# Patient Record
Sex: MALE | Race: OTHER | NOT HISPANIC OR LATINO | URBAN - METROPOLITAN AREA
[De-identification: names, ages, dates, MRNs, and addresses within clinical notes are randomized per-mention and may not be internally consistent; named-entity substitution may affect disease eponyms.]

---

## 2022-10-06 ENCOUNTER — INPATIENT (INPATIENT)
Facility: HOSPITAL | Age: 52
LOS: 1 days | Discharge: HOME | DRG: 247 | End: 2022-10-08
Attending: INTERNAL MEDICINE | Admitting: INTERNAL MEDICINE
Payer: MEDICAID

## 2022-10-06 VITALS
OXYGEN SATURATION: 99 % | RESPIRATION RATE: 18 BRPM | TEMPERATURE: 98 F | SYSTOLIC BLOOD PRESSURE: 147 MMHG | HEIGHT: 69 IN | DIASTOLIC BLOOD PRESSURE: 80 MMHG | WEIGHT: 223.99 LBS | HEART RATE: 81 BPM

## 2022-10-06 DIAGNOSIS — I21.3 ST ELEVATION (STEMI) MYOCARDIAL INFARCTION OF UNSPECIFIED SITE: ICD-10-CM

## 2022-10-06 LAB
ALBUMIN SERPL ELPH-MCNC: 4.4 G/DL — SIGNIFICANT CHANGE UP (ref 3.3–5)
ALP SERPL-CCNC: 59 U/L — SIGNIFICANT CHANGE UP (ref 40–120)
ALT FLD-CCNC: 32 U/L — SIGNIFICANT CHANGE UP (ref 10–45)
ANION GAP SERPL CALC-SCNC: 11 MMOL/L — SIGNIFICANT CHANGE UP (ref 5–17)
APTT BLD: 174.1 SEC — CRITICAL HIGH (ref 27.5–35.5)
AST SERPL-CCNC: 23 U/L — SIGNIFICANT CHANGE UP (ref 10–40)
BASOPHILS # BLD AUTO: 0.06 K/UL — SIGNIFICANT CHANGE UP (ref 0–0.2)
BASOPHILS NFR BLD AUTO: 0.7 % — SIGNIFICANT CHANGE UP (ref 0–2)
BILIRUB SERPL-MCNC: 0.8 MG/DL — SIGNIFICANT CHANGE UP (ref 0.2–1.2)
BLD GP AB SCN SERPL QL: NEGATIVE — SIGNIFICANT CHANGE UP
BUN SERPL-MCNC: 12 MG/DL — SIGNIFICANT CHANGE UP (ref 7–23)
CALCIUM SERPL-MCNC: 9.2 MG/DL — SIGNIFICANT CHANGE UP (ref 8.4–10.5)
CHLORIDE SERPL-SCNC: 104 MMOL/L — SIGNIFICANT CHANGE UP (ref 96–108)
CK MB BLD-MCNC: 1.6 % — SIGNIFICANT CHANGE UP (ref 0–3.5)
CK MB CFR SERPL CALC: 3.5 NG/ML — SIGNIFICANT CHANGE UP (ref 0–6.7)
CK SERPL-CCNC: 224 U/L — HIGH (ref 30–200)
CO2 SERPL-SCNC: 25 MMOL/L — SIGNIFICANT CHANGE UP (ref 22–31)
CREAT SERPL-MCNC: 0.95 MG/DL — SIGNIFICANT CHANGE UP (ref 0.5–1.3)
EGFR: 96 ML/MIN/1.73M2 — SIGNIFICANT CHANGE UP
EOSINOPHIL # BLD AUTO: 0.31 K/UL — SIGNIFICANT CHANGE UP (ref 0–0.5)
EOSINOPHIL NFR BLD AUTO: 3.6 % — SIGNIFICANT CHANGE UP (ref 0–6)
GLUCOSE SERPL-MCNC: 113 MG/DL — HIGH (ref 70–99)
HCT VFR BLD CALC: 43.1 % — SIGNIFICANT CHANGE UP (ref 39–50)
HGB BLD-MCNC: 15.1 G/DL — SIGNIFICANT CHANGE UP (ref 13–17)
IMM GRANULOCYTES NFR BLD AUTO: 0.5 % — SIGNIFICANT CHANGE UP (ref 0–0.9)
INR BLD: 1.11 RATIO — SIGNIFICANT CHANGE UP (ref 0.88–1.16)
LYMPHOCYTES # BLD AUTO: 2.11 K/UL — SIGNIFICANT CHANGE UP (ref 1–3.3)
LYMPHOCYTES # BLD AUTO: 24.4 % — SIGNIFICANT CHANGE UP (ref 13–44)
MAGNESIUM SERPL-MCNC: 1.9 MG/DL — SIGNIFICANT CHANGE UP (ref 1.6–2.6)
MCHC RBC-ENTMCNC: 30.1 PG — SIGNIFICANT CHANGE UP (ref 27–34)
MCHC RBC-ENTMCNC: 35 GM/DL — SIGNIFICANT CHANGE UP (ref 32–36)
MCV RBC AUTO: 86 FL — SIGNIFICANT CHANGE UP (ref 80–100)
MONOCYTES # BLD AUTO: 0.65 K/UL — SIGNIFICANT CHANGE UP (ref 0–0.9)
MONOCYTES NFR BLD AUTO: 7.5 % — SIGNIFICANT CHANGE UP (ref 2–14)
NEUTROPHILS # BLD AUTO: 5.46 K/UL — SIGNIFICANT CHANGE UP (ref 1.8–7.4)
NEUTROPHILS NFR BLD AUTO: 63.3 % — SIGNIFICANT CHANGE UP (ref 43–77)
NRBC # BLD: 0 /100 WBCS — SIGNIFICANT CHANGE UP (ref 0–0)
NT-PROBNP SERPL-SCNC: 40 PG/ML — SIGNIFICANT CHANGE UP (ref 0–300)
PHOSPHATE SERPL-MCNC: 2.9 MG/DL — SIGNIFICANT CHANGE UP (ref 2.5–4.5)
PLATELET # BLD AUTO: 208 K/UL — SIGNIFICANT CHANGE UP (ref 150–400)
POTASSIUM SERPL-MCNC: 3.7 MMOL/L — SIGNIFICANT CHANGE UP (ref 3.5–5.3)
POTASSIUM SERPL-SCNC: 3.7 MMOL/L — SIGNIFICANT CHANGE UP (ref 3.5–5.3)
PROT SERPL-MCNC: 7.2 G/DL — SIGNIFICANT CHANGE UP (ref 6–8.3)
PROTHROM AB SERPL-ACNC: 12.8 SEC — SIGNIFICANT CHANGE UP (ref 10.5–13.4)
RBC # BLD: 5.01 M/UL — SIGNIFICANT CHANGE UP (ref 4.2–5.8)
RBC # FLD: 11.8 % — SIGNIFICANT CHANGE UP (ref 10.3–14.5)
RH IG SCN BLD-IMP: POSITIVE — SIGNIFICANT CHANGE UP
SODIUM SERPL-SCNC: 140 MMOL/L — SIGNIFICANT CHANGE UP (ref 135–145)
TROPONIN T, HIGH SENSITIVITY RESULT: 67 NG/L — HIGH (ref 0–51)
WBC # BLD: 8.63 K/UL — SIGNIFICANT CHANGE UP (ref 3.8–10.5)
WBC # FLD AUTO: 8.63 K/UL — SIGNIFICANT CHANGE UP (ref 3.8–10.5)

## 2022-10-06 PROCEDURE — 93458 L HRT ARTERY/VENTRICLE ANGIO: CPT | Mod: 26,59

## 2022-10-06 PROCEDURE — 99152 MOD SED SAME PHYS/QHP 5/>YRS: CPT

## 2022-10-06 PROCEDURE — 92941 PRQ TRLML REVSC TOT OCCL AMI: CPT | Mod: LD

## 2022-10-06 PROCEDURE — 93010 ELECTROCARDIOGRAM REPORT: CPT

## 2022-10-06 PROCEDURE — 99292 CRITICAL CARE ADDL 30 MIN: CPT

## 2022-10-06 RX ORDER — CHLORHEXIDINE GLUCONATE 213 G/1000ML
1 SOLUTION TOPICAL
Refills: 0 | Status: DISCONTINUED | OUTPATIENT
Start: 2022-10-06 | End: 2022-10-08

## 2022-10-06 RX ORDER — ASPIRIN/CALCIUM CARB/MAGNESIUM 324 MG
81 TABLET ORAL DAILY
Refills: 0 | Status: DISCONTINUED | OUTPATIENT
Start: 2022-10-07 | End: 2022-10-08

## 2022-10-06 RX ORDER — MAGNESIUM SULFATE 500 MG/ML
1 VIAL (ML) INJECTION ONCE
Refills: 0 | Status: COMPLETED | OUTPATIENT
Start: 2022-10-06 | End: 2022-10-06

## 2022-10-06 RX ORDER — POTASSIUM CHLORIDE 20 MEQ
40 PACKET (EA) ORAL ONCE
Refills: 0 | Status: COMPLETED | OUTPATIENT
Start: 2022-10-06 | End: 2022-10-06

## 2022-10-06 RX ORDER — METOPROLOL TARTRATE 50 MG
12.5 TABLET ORAL
Refills: 0 | Status: DISCONTINUED | OUTPATIENT
Start: 2022-10-06 | End: 2022-10-07

## 2022-10-06 RX ORDER — HEPARIN SODIUM 5000 [USP'U]/ML
5000 INJECTION INTRAVENOUS; SUBCUTANEOUS EVERY 8 HOURS
Refills: 0 | Status: DISCONTINUED | OUTPATIENT
Start: 2022-10-07 | End: 2022-10-08

## 2022-10-06 RX ORDER — INFLUENZA VIRUS VACCINE 15; 15; 15; 15 UG/.5ML; UG/.5ML; UG/.5ML; UG/.5ML
0.5 SUSPENSION INTRAMUSCULAR ONCE
Refills: 0 | Status: COMPLETED | OUTPATIENT
Start: 2022-10-06 | End: 2022-10-06

## 2022-10-06 RX ORDER — ATORVASTATIN CALCIUM 80 MG/1
80 TABLET, FILM COATED ORAL AT BEDTIME
Refills: 0 | Status: DISCONTINUED | OUTPATIENT
Start: 2022-10-06 | End: 2022-10-08

## 2022-10-06 RX ORDER — CLOPIDOGREL BISULFATE 75 MG/1
600 TABLET, FILM COATED ORAL ONCE
Refills: 0 | Status: COMPLETED | OUTPATIENT
Start: 2022-10-07 | End: 2022-10-07

## 2022-10-06 RX ORDER — METOPROLOL TARTRATE 50 MG
12.5 TABLET ORAL
Refills: 0 | Status: DISCONTINUED | OUTPATIENT
Start: 2022-10-06 | End: 2022-10-06

## 2022-10-06 RX ADMIN — Medication 100 GRAM(S): at 19:59

## 2022-10-06 RX ADMIN — ATORVASTATIN CALCIUM 80 MILLIGRAM(S): 80 TABLET, FILM COATED ORAL at 19:59

## 2022-10-06 RX ADMIN — Medication 40 MILLIEQUIVALENT(S): at 19:59

## 2022-10-06 RX ADMIN — CHLORHEXIDINE GLUCONATE 1 APPLICATION(S): 213 SOLUTION TOPICAL at 19:59

## 2022-10-06 RX ADMIN — Medication 12.5 MILLIGRAM(S): at 18:43

## 2022-10-06 NOTE — H&P ADULT - HISTORY OF PRESENT ILLNESS
52M Luxembourgish speaker with no known pmhx who initially presented to [facility] for b/l chest pain x 2 days which radiated down b/l upper arms, found to have AWSTEMI, transferred to [facility] for LHC. S/p LHC with 90% pLAD s/p DESx1. He denies ever experiencing sx like this before. Denies any residual CP. No SOB. Uninsured and has not seen a doctor in years. No PCP.  52M Macedonian speaker with no known pmhx who initially presented to [facility] for b/l chest pain x 2 days which radiated down b/l upper arms, found to have AWSTEMI, transferred to [facility] for LHC. S/p LHC with 99% pLAD s/p DESx1. He denies ever experiencing sx like this before. Denies any residual CP. No SOB. Uninsured and has not seen a doctor in years. No PCP.  52M Georgian speaker with no known pmhx who initially presented to [facility] for b/l chest pain x 2 days which radiated down b/l upper arms, found to have AWSTEMI, transferred to [facility] for LHC. S/p LHC with 99% pLAD s/p DESx1, D1 60%, D2 70%. He denies ever experiencing sx like this before. Denies any residual CP. No SOB. Uninsured and has not seen a doctor in years. No PCP.

## 2022-10-06 NOTE — H&P ADULT - NSHPPHYSICALEXAM_GEN_ALL_CORE
Gen: no acute distress  HEENT: atraumatic, normocephalic, extraocular muscles intact, no conjunctival injection  CV: regular rate and rhythm, no murmurs  Resp: CTAB  GI: soft, nontender, nondistended, BS+  MSK: extremities atraumatic, no cyanosis or clubbing, +R radial band  Skin: warm, dry, no rashes or lesions  Neuro: no focal deficits, sensation grossly intact  Psych: alert and oriented x3, appropriate mood and affect

## 2022-10-06 NOTE — H&P ADULT - ASSESSMENT
ASSESSMENT  52M Mohawk speaker with no known pmhx who presented with chest pain found to have AWSTEMI, s/p LHC with 90% pLAD s/p DESx1.     PLAN  Neuro  AOx3 at baseline    CV  #AWSTEMI  C 10/6 with 90% pLAD s/p DESx1.   - ASA and Plavix/Brilinta  - TTE ordered  - Atorvastatin 80  - Lipid panel, A1c, TSH  - BB and ACE pending clinical course     Pulmonary  No active issues    GI  DASH diet    Renal  No active issues    MSK/Skin  No active issues    ID  No active issues    Endo  - obtain A1c and TSH    Heme/Onc  #DVT ppx: HSQ   ASSESSMENT  52M Indonesian speaker with no known pmhx who presented with chest pain found to have AWSTEMI, s/p LHC with 90% pLAD s/p DESx1.     PLAN  Neuro  AOx3 at baseline    CV  #AWSTEMI  C 10/6 with 90% pLAD s/p DESx1.   - ASA and Plavix/Brilinta  - TTE ordered  - Atorvastatin 80  - Lipid panel, A1c, TSH  - BB and ACE as tolerated    Pulmonary  No active issues    GI  DASH diet    Renal  No active issues    MSK/Skin  No active issues    ID  No active issues    Endo  - obtain A1c and TSH    Heme/Onc  #DVT ppx: HSQ   ASSESSMENT  52M Latvian speaker with no known pmhx who presented with chest pain found to have AWSTEMI, s/p LHC with 99% pLAD s/p DESx1.     PLAN  Neuro  AOx3 at baseline    CV  #AWSTEMI  [facility] 10/6 with 99% pLAD s/p DESx1.   - ASA and Plavix/Brilinta  - TTE ordered  - Atorvastatin 80  - Lipid panel, A1c, TSH  - BB and ACE as tolerated    Pulmonary  No active issues    GI  DASH diet    Renal  No active issues    MSK/Skin  No active issues    ID  No active issues    Endo  - obtain A1c and TSH    Heme/Onc  #DVT ppx: HSQ   ASSESSMENT  52M Czech speaker with no known pmhx who presented with chest pain found to have AWSTEMI, s/p LHC with 99% pLAD s/p DESx1, D1 60%, D2 70%.    PLAN  Neuro  AOx3 at baseline    CV  #AWSTEMI  [facility] 10/6 with 99% pLAD s/p DESx1, D1 60%, D2 70%.  - ASA and Plavix/Brilinta  - TTE ordered  - Atorvastatin 80  - Lipid panel, A1c, TSH  - BB and ACE as tolerated    Pulmonary  No active issues    GI  DASH diet    Renal  No active issues    MSK/Skin  No active issues    ID  No active issues    Endo  - obtain A1c and TSH    Heme/Onc  #DVT ppx: HSQ

## 2022-10-06 NOTE — PATIENT PROFILE ADULT - FALL HARM RISK - UNIVERSAL INTERVENTIONS
Bed in lowest position, wheels locked, appropriate side rails in place/Call bell, personal items and telephone in reach/Instruct patient to call for assistance before getting out of bed or chair/Non-slip footwear when patient is out of bed/[place] to call system/Physically safe environment - no spills, clutter or unnecessary equipment/Purposeful Proactive Rounding/Room/bathroom lighting operational, light cord in reach

## 2022-10-06 NOTE — PROGRESS NOTE ADULT - SUBJECTIVE AND OBJECTIVE BOX
AMALIA MERCHANT  MRN-[id number]  Patient is a 52y old  Male who presents with a chief complaint of STEMI (06 Oct 2022 18:16)    HPI:  52M Urdu speaker with no known pmhx who initially presented to [facility] for b/l chest pain x 2 days which radiated down b/l upper arms, found to have AWSTEMI, transferred to [facility] for LHC. S/p LHC with 99% pLAD s/p DESx1, D1 60%, D2 70%. He denies ever experiencing sx like this before. Denies any residual CP. No SOB. Uninsured and has not seen a doctor in years. No PCP.  (06 Oct 2022 18:16)      Hospital Course:    24 HOUR EVENTS:    REVIEW OF SYSTEMS:    CONSTITUTIONAL: No weakness, fevers or chills  EYES/ENT: No visual changes;  No vertigo or throat pain   NECK: No pain or stiffness  RESPIRATORY: No cough, wheezing, hemoptysis; No shortness of breath  CARDIOVASCULAR: No chest pain or palpitations  GASTROINTESTINAL: No abdominal or epigastric pain. No nausea, vomiting, or hematemesis; No diarrhea or constipation. No melena or hematochezia.  GENITOURINARY: No dysuria, frequency or hematuria  NEUROLOGICAL: No numbness or weakness  SKIN: No itching, rashes      ICU Vital Signs Last 24 Hrs  T(C): 36.6 (06 Oct 2022 18:00), Max: 36.6 (06 Oct 2022 18:00)  T(F): 97.8 (06 Oct 2022 18:00), Max: 97.8 (06 Oct 2022 18:00)  HR: 73 (06 Oct 2022 20:00) (73 - 88)  BP: 128/64 (06 Oct 2022 20:00) (127/73 - 151/88)  BP(mean): 91 (06 Oct 2022 20:00) (91 - 113)  ABP: --  ABP(mean): --  RR: 18 (06 Oct 2022 20:00) (14 - 29)  SpO2: 98% (06 Oct 2022 20:00) (96% - 99%)    O2 Parameters below as of 06 Oct 2022 18:00  Patient On (Oxygen Delivery Method): room air      I&O's Summary    06 Oct 2022 07:01  -  06 Oct 2022 20:25  --------------------------------------------------------  IN: 120 mL / OUT: 500 mL / NET: -380 mL        CAPILLARY BLOOD GLUCOSE    ============================I/O===========================   I&O's Detail    06 Oct 2022 07:01  -  06 Oct 2022 20:25  --------------------------------------------------------  IN:    Oral Fluid: 120 mL  Total IN: 120 mL    OUT:    Voided (mL): 500 mL  Total OUT: 500 mL    Total NET: -380 mL        ============================ LABS =========================                        15.1   8.63  )-----------( 208      ( 06 Oct 2022 18:42 )             43.1     10-06    140  |  104  |  12  ----------------------------<  113<H>  3.7   |  25  |  0.95    Ca    9.2      06 Oct 2022 18:42  Phos  2.9     10-06  Mg     1.9     10-06    TPro  7.2  /  Alb  4.4  /  TBili  0.8  /  DBili  x   /  AST  23  /  ALT  32  /  AlkPhos  59  10-06    Troponin T, High Sensitivity Result: 67 ng/L (10-06-22 @ 18:42)      Creatine Kinase, Serum: 224 U/L (10-06-22 @ 18:42)          LIVER FUNCTIONS - ( 06 Oct 2022 18:42 )  Alb: 4.4 g/dL / Pro: 7.2 g/dL / ALK PHOS: 59 U/L / ALT: 32 U/L / AST: 23 U/L / GGT: x           PT/INR - ( 06 Oct 2022 18:42 )   PT: 12.8 sec;   INR: 1.11 ratio         PTT - ( 06 Oct 2022 18:42 )  PTT:174.1 sec        ======================Micro/Rad/Cardio=================  Telemtry: Reviewed   EKG: Reviewed  CXR: Reviewed  Culture: Reviewed   Echo:   Cath:   ======================================================  PAST MEDICAL & SURGICAL HISTORY:  No pertinent past medical history      No significant past surgical history        ====================ASSESSMENT ==============  52M Urdu speaker with no known pmhx who presented with chest pain found to have AWSTEMI, s/p LHC with 99% pLAD s/p DESx1, D1 60%, D2 70%.    Plan:  ====================== NEUROLOGY=====================  AOx3 at baseline    ==================== RESPIRATORY======================  No active issues  ====================CARDIOVASCULAR==================  #AWSTEMI  LHC 10/6 with 99% pLAD s/p DESx1, D1 60%, D2 70%.  - ASA and Plavix/Brilinta  - TTE ordered  - Atorvastatin 80  - Lipid panel, A1c, TSH  - BB and ACE as tolerated    ===================HEMATOLOGIC/ONC ===================  #DVT ppx: HSQ  ===================== RENAL =========================  No active issues  ==================== GASTROINTESTINAL===================  DASH diet    =======================    ENDOCRINE  =====================  - obtain A1c and TSH    ========================INFECTIOUS DISEASE================  No active issues    Patient requires continuous monitoring with bedside rhythm monitoring, pulse ox monitoring, and intermittent blood gas analysis. Care plan discussed with ICU care team. Patient remained critical and at risk for life threatening decompensation.  Patient seen, examined and plan discussed with CCU team during rounds.     I have personally provided ____ minutes of critical care time excluding time spent on separate procedures, in addition to initial critical care time provided by the CICU Attending, Dr. Tanner.     By signing my name below, I, Sharon Deluna, attest that this documentation has been prepared under the direction and in the presence of Dang Harding PA.  Electronically signed: Alexus Shultz, 10-06-22 @ 20:25    I, Dang Harding, personally performed the services described in this documentation. all medical record entries made by the alexus were at my direction and in my presence. I have reviewed the chart and agree that the record reflects my personal performance and is accurate and complete  Electronically signed: Dang Harding PA.       AMALIA MERCHANT  MRN-[id number]  Patient is a 52y old  Male who presents with a chief complaint of STEMI (06 Oct 2022 18:16)    HPI:  52M Maltese speaker with no known pmhx who initially presented to [facility] for b/l chest pain x 2 days which radiated down b/l upper arms, found to have AWSTEMI, transferred to [facility] for LHC. S/p LHC with 99% pLAD s/p DESx1, D1 60%, D2 70%. He denies ever experiencing sx like this before. Denies any residual CP. No SOB. Uninsured and has not seen a doctor in years. No PCP.  (06 Oct 2022 18:16)    EVENTS:  - remains stable w/o chest pain on RA  - R radial band in place, will d/c and monitor site ~10pm  - will uptitrate GDMT as tolerated    REVIEW OF SYSTEMS:    CONSTITUTIONAL: No weakness, fevers or chills  EYES/ENT: No visual changes;  No vertigo or throat pain   NECK: No pain or stiffness  RESPIRATORY: No cough, wheezing, hemoptysis; No shortness of breath  CARDIOVASCULAR: No chest pain or palpitations  GASTROINTESTINAL: No abdominal or epigastric pain. No nausea, vomiting, or hematemesis; No diarrhea or constipation. No melena or hematochezia.  GENITOURINARY: No dysuria, frequency or hematuria  NEUROLOGICAL: No numbness or weakness  SKIN: No itching, rashes      ICU Vital Signs Last 24 Hrs  T(C): 36.6 (06 Oct 2022 18:00), Max: 36.6 (06 Oct 2022 18:00)  T(F): 97.8 (06 Oct 2022 18:00), Max: 97.8 (06 Oct 2022 18:00)  HR: 73 (06 Oct 2022 20:00) (73 - 88)  BP: 128/64 (06 Oct 2022 20:00) (127/73 - 151/88)  BP(mean): 91 (06 Oct 2022 20:00) (91 - 113)  ABP: --  ABP(mean): --  RR: 18 (06 Oct 2022 20:00) (14 - 29)  SpO2: 98% (06 Oct 2022 20:00) (96% - 99%)    O2 Parameters below as of 06 Oct 2022 18:00  Patient On (Oxygen Delivery Method): room air      I&O's Summary    06 Oct 2022 07:01  -  06 Oct 2022 20:25  --------------------------------------------------------  IN: 120 mL / OUT: 500 mL / NET: -380 mL    CAPILLARY BLOOD GLUCOSE    ============================I/O===========================   I&O's Detail    06 Oct 2022 07:01  -  06 Oct 2022 20:25  --------------------------------------------------------  IN:    Oral Fluid: 120 mL  Total IN: 120 mL    OUT:    Voided (mL): 500 mL  Total OUT: 500 mL    Total NET: -380 mL    ============================ LABS =========================                        15.1   8.63  )-----------( 208      ( 06 Oct 2022 18:42 )             43.1     10-06    140  |  104  |  12  ----------------------------<  113<H>  3.7   |  25  |  0.95    Ca    9.2      06 Oct 2022 18:42  Phos  2.9     10-06  Mg     1.9     10-06    TPro  7.2  /  Alb  4.4  /  TBili  0.8  /  DBili  x   /  AST  23  /  ALT  32  /  AlkPhos  59  10-06  Troponin T, High Sensitivity Result: 67 ng/L (10-06-22 @ 18:42)  Creatine Kinase, Serum: 224 U/L (10-06-22 @ 18:42)    LIVER FUNCTIONS - ( 06 Oct 2022 18:42 )  Alb: 4.4 g/dL / Pro: 7.2 g/dL / ALK PHOS: 59 U/L / ALT: 32 U/L / AST: 23 U/L / GGT: x           PT/INR - ( 06 Oct 2022 18:42 )   PT: 12.8 sec;   INR: 1.11 ratio         PTT - ( 06 Oct 2022 18:42 )  PTT:174.1 sec    ======================Micro/Rad/Cardio=================  Telemtry: Reviewed   EKG: Reviewed  CXR: Reviewed  Culture: Reviewed   Echo:   Cath:   ======================================================  PAST MEDICAL & SURGICAL HISTORY:  No pertinent past medical history    No significant past surgical history    ====================ASSESSMENT ==============  52M Maltese speaker with no known pmhx who presented with chest pain found to have AWSTEMI, s/p LHC with 99% pLAD s/p DESx1, D1 60%, D2 70%.    Plan:  ====================== NEUROLOGY=====================  #No active issues:  AOx3 at baseline    ==================== RESPIRATORY======================  # No active issues:  - stable on RA, O2sat 98%    ====================CARDIOVASCULAR==================  #AWSTEMI  - s/p LHC 10/6 with 99% pLAD s/p DESx1, D1 60%, D2 70%.  - ASA and Plavix/ discontinue Brilinta  - TTE ordered  - Atorvastatin 80  - Lipid panel, A1c, TSH  - BB: Lopressor 12.5mg BID  - will add ACE as tolerated    ===================HEMATOLOGIC/ONC ===================  #DVT ppx:   - continue HSQ  ===================== RENAL =========================  #No active issues:  - SCr wnl, 0.95 on arrival to CICU  - monitor UOP   ==================== GASTROINTESTINAL===================  #Diet:  - DASH diet    =======================    ENDOCRINE  =====================  #No active issues:  - f/u A1c and TSH    ========================INFECTIOUS DISEASE================  #No active issues:  - afebrile w/o leukocytosis  - continue to monitor/trend temps/WBC    Patient requires continuous monitoring with bedside rhythm monitoring, pulse ox monitoring, and intermittent blood gas analysis. Care plan discussed with ICU care team. Patient remained critical and at risk for life threatening decompensation.  Patient seen, examined and plan discussed with CCU team during rounds.     I have personally provided 35 minutes of critical care time excluding time spent on separate procedures, in addition to initial critical care time provided by the CICU Attending, Dr. Tanner.     By signing my name below, MARCO, Sharon Deluna, attest that this documentation has been prepared under the direction and in the presence of Dang Harding PA.  Electronically signed: Dave Shultz, 10-06-22 @ 20:25    MARCO, Dang Harding, personally performed the services described in this documentation. all medical record entries made by the scribe were at my direction and in my presence. I have reviewed the chart and agree that the record reflects my personal performance and is accurate and complete  Electronically signed: Dang Harding PA.

## 2022-10-06 NOTE — H&P ADULT - NSHPREVIEWOFSYSTEMS_GEN_ALL_CORE
Constitutional: no weight change, no fever, no chills, no night sweats, no fatigue  ENT/mouth: no hearing changes, no sore throat, no rhinorrhea  Eyes: no eye pain, no eye redness, no eye swelling, no vision changes  CV: no chest pain, no orthopnea, no palpitations  Resp: no shortness of breath, no cough, no wheezing  GI: no abdominal pain, no nausea, no vomiting, no diarrhea, no constipation  : no dysuria, no urinary frequency or hesitancy, no hematuria  Skin/MSK: no pain, no rashes, no lower extremity edema  Neuro: no weakness, no numbness, no loss of consciousness, no syncope, no dizziness, no headache

## 2022-10-06 NOTE — H&P ADULT - NSHPSOCIALHISTORY_GEN_ALL_CORE
Lives with friends.  Works as a helper part time  Denies tobacco use. Drinks 1-2 beers/week. Denies any other substances.

## 2022-10-06 NOTE — CHART NOTE - NSCHARTNOTEFT_GEN_A_CORE
Removal of Radial Band    Pulses in the right upper extremity are palpable. The patient was placed in the supine position. The insertion site was identified and the band deflated per protocol. The radial band was removed slowly. Direct pressure was not applicable.      Monitoring of the right wrists and both upper extremities including neuro-vascular checks and vital signs every 15 minutes x 4.    Complications: None      10/6/2022, 11:40PM

## 2022-10-07 ENCOUNTER — TRANSCRIPTION ENCOUNTER (OUTPATIENT)
Age: 52
End: 2022-10-07

## 2022-10-07 LAB
A1C WITH ESTIMATED AVERAGE GLUCOSE RESULT: 5.8 % — HIGH (ref 4–5.6)
ALBUMIN SERPL ELPH-MCNC: 4.7 G/DL — SIGNIFICANT CHANGE UP (ref 3.3–5)
ALP SERPL-CCNC: 66 U/L — SIGNIFICANT CHANGE UP (ref 40–120)
ALT FLD-CCNC: 36 U/L — SIGNIFICANT CHANGE UP (ref 10–45)
ANION GAP SERPL CALC-SCNC: 9 MMOL/L — SIGNIFICANT CHANGE UP (ref 5–17)
APTT BLD: 30.5 SEC — SIGNIFICANT CHANGE UP (ref 27.5–35.5)
AST SERPL-CCNC: 29 U/L — SIGNIFICANT CHANGE UP (ref 10–40)
BASOPHILS # BLD AUTO: 0.06 K/UL — SIGNIFICANT CHANGE UP (ref 0–0.2)
BASOPHILS NFR BLD AUTO: 0.8 % — SIGNIFICANT CHANGE UP (ref 0–2)
BILIRUB SERPL-MCNC: 0.5 MG/DL — SIGNIFICANT CHANGE UP (ref 0.2–1.2)
BUN SERPL-MCNC: 15 MG/DL — SIGNIFICANT CHANGE UP (ref 7–23)
CALCIUM SERPL-MCNC: 9.5 MG/DL — SIGNIFICANT CHANGE UP (ref 8.4–10.5)
CHLORIDE SERPL-SCNC: 104 MMOL/L — SIGNIFICANT CHANGE UP (ref 96–108)
CHOLEST SERPL-MCNC: 201 MG/DL — HIGH
CK MB BLD-MCNC: 2.9 % — SIGNIFICANT CHANGE UP (ref 0–3.5)
CK MB BLD-MCNC: 3.6 % — HIGH (ref 0–3.5)
CK MB BLD-MCNC: 4.3 % — HIGH (ref 0–3.5)
CK MB CFR SERPL CALC: 10.6 NG/ML — HIGH (ref 0–6.7)
CK MB CFR SERPL CALC: 12.4 NG/ML — HIGH (ref 0–6.7)
CK MB CFR SERPL CALC: 6.9 NG/ML — HIGH (ref 0–6.7)
CK SERPL-CCNC: 242 U/L — HIGH (ref 30–200)
CK SERPL-CCNC: 288 U/L — HIGH (ref 30–200)
CK SERPL-CCNC: 291 U/L — HIGH (ref 30–200)
CO2 SERPL-SCNC: 27 MMOL/L — SIGNIFICANT CHANGE UP (ref 22–31)
CREAT SERPL-MCNC: 0.94 MG/DL — SIGNIFICANT CHANGE UP (ref 0.5–1.3)
EGFR: 98 ML/MIN/1.73M2 — SIGNIFICANT CHANGE UP
EOSINOPHIL # BLD AUTO: 0.26 K/UL — SIGNIFICANT CHANGE UP (ref 0–0.5)
EOSINOPHIL NFR BLD AUTO: 3.5 % — SIGNIFICANT CHANGE UP (ref 0–6)
ESTIMATED AVERAGE GLUCOSE: 120 MG/DL — HIGH (ref 68–114)
GLUCOSE SERPL-MCNC: 103 MG/DL — HIGH (ref 70–99)
HCT VFR BLD CALC: 46.3 % — SIGNIFICANT CHANGE UP (ref 39–50)
HDLC SERPL-MCNC: 39 MG/DL — LOW
HGB BLD-MCNC: 15.9 G/DL — SIGNIFICANT CHANGE UP (ref 13–17)
IMM GRANULOCYTES NFR BLD AUTO: 0.4 % — SIGNIFICANT CHANGE UP (ref 0–0.9)
INR BLD: 0.99 RATIO — SIGNIFICANT CHANGE UP (ref 0.88–1.16)
LIPID PNL WITH DIRECT LDL SERPL: 146 MG/DL — HIGH
LYMPHOCYTES # BLD AUTO: 1.42 K/UL — SIGNIFICANT CHANGE UP (ref 1–3.3)
LYMPHOCYTES # BLD AUTO: 19.3 % — SIGNIFICANT CHANGE UP (ref 13–44)
MAGNESIUM SERPL-MCNC: 2.2 MG/DL — SIGNIFICANT CHANGE UP (ref 1.6–2.6)
MCHC RBC-ENTMCNC: 29.8 PG — SIGNIFICANT CHANGE UP (ref 27–34)
MCHC RBC-ENTMCNC: 34.3 GM/DL — SIGNIFICANT CHANGE UP (ref 32–36)
MCV RBC AUTO: 86.9 FL — SIGNIFICANT CHANGE UP (ref 80–100)
MONOCYTES # BLD AUTO: 0.73 K/UL — SIGNIFICANT CHANGE UP (ref 0–0.9)
MONOCYTES NFR BLD AUTO: 9.9 % — SIGNIFICANT CHANGE UP (ref 2–14)
NEUTROPHILS # BLD AUTO: 4.86 K/UL — SIGNIFICANT CHANGE UP (ref 1.8–7.4)
NEUTROPHILS NFR BLD AUTO: 66.1 % — SIGNIFICANT CHANGE UP (ref 43–77)
NON HDL CHOLESTEROL: 162 MG/DL — HIGH
NRBC # BLD: 0 /100 WBCS — SIGNIFICANT CHANGE UP (ref 0–0)
PHOSPHATE SERPL-MCNC: 3.9 MG/DL — SIGNIFICANT CHANGE UP (ref 2.5–4.5)
PLATELET # BLD AUTO: 220 K/UL — SIGNIFICANT CHANGE UP (ref 150–400)
POTASSIUM SERPL-MCNC: 4.1 MMOL/L — SIGNIFICANT CHANGE UP (ref 3.5–5.3)
POTASSIUM SERPL-SCNC: 4.1 MMOL/L — SIGNIFICANT CHANGE UP (ref 3.5–5.3)
PROT SERPL-MCNC: 7.8 G/DL — SIGNIFICANT CHANGE UP (ref 6–8.3)
PROTHROM AB SERPL-ACNC: 11.4 SEC — SIGNIFICANT CHANGE UP (ref 10.5–13.4)
RBC # BLD: 5.33 M/UL — SIGNIFICANT CHANGE UP (ref 4.2–5.8)
RBC # FLD: 11.8 % — SIGNIFICANT CHANGE UP (ref 10.3–14.5)
SODIUM SERPL-SCNC: 140 MMOL/L — SIGNIFICANT CHANGE UP (ref 135–145)
TRIGL SERPL-MCNC: 80 MG/DL — SIGNIFICANT CHANGE UP
TROPONIN T, HIGH SENSITIVITY RESULT: 149 NG/L — HIGH (ref 0–51)
TROPONIN T, HIGH SENSITIVITY RESULT: 166 NG/L — HIGH (ref 0–51)
TROPONIN T, HIGH SENSITIVITY RESULT: 207 NG/L — HIGH (ref 0–51)
TSH SERPL-MCNC: 2.62 UIU/ML — SIGNIFICANT CHANGE UP (ref 0.27–4.2)
WBC # BLD: 7.36 K/UL — SIGNIFICANT CHANGE UP (ref 3.8–10.5)
WBC # FLD AUTO: 7.36 K/UL — SIGNIFICANT CHANGE UP (ref 3.8–10.5)

## 2022-10-07 PROCEDURE — 99291 CRITICAL CARE FIRST HOUR: CPT

## 2022-10-07 PROCEDURE — 99231 SBSQ HOSP IP/OBS SF/LOW 25: CPT

## 2022-10-07 PROCEDURE — 93306 TTE W/DOPPLER COMPLETE: CPT | Mod: 26

## 2022-10-07 RX ORDER — METOPROLOL TARTRATE 50 MG
25 TABLET ORAL
Refills: 0 | Status: DISCONTINUED | OUTPATIENT
Start: 2022-10-07 | End: 2022-10-07

## 2022-10-07 RX ORDER — LISINOPRIL 2.5 MG/1
2.5 TABLET ORAL DAILY
Refills: 0 | Status: DISCONTINUED | OUTPATIENT
Start: 2022-10-07 | End: 2022-10-07

## 2022-10-07 RX ORDER — CLOPIDOGREL BISULFATE 75 MG/1
75 TABLET, FILM COATED ORAL DAILY
Refills: 0 | Status: DISCONTINUED | OUTPATIENT
Start: 2022-10-08 | End: 2022-10-08

## 2022-10-07 RX ORDER — METOPROLOL TARTRATE 50 MG
25 TABLET ORAL ONCE
Refills: 0 | Status: COMPLETED | OUTPATIENT
Start: 2022-10-07 | End: 2022-10-07

## 2022-10-07 RX ORDER — METOPROLOL TARTRATE 50 MG
50 TABLET ORAL DAILY
Refills: 0 | Status: DISCONTINUED | OUTPATIENT
Start: 2022-10-08 | End: 2022-10-08

## 2022-10-07 RX ORDER — LOSARTAN POTASSIUM 100 MG/1
50 TABLET, FILM COATED ORAL DAILY
Refills: 0 | Status: DISCONTINUED | OUTPATIENT
Start: 2022-10-08 | End: 2022-10-08

## 2022-10-07 RX ADMIN — Medication 25 MILLIGRAM(S): at 17:45

## 2022-10-07 RX ADMIN — ATORVASTATIN CALCIUM 80 MILLIGRAM(S): 80 TABLET, FILM COATED ORAL at 21:12

## 2022-10-07 RX ADMIN — Medication 25 MILLIGRAM(S): at 05:25

## 2022-10-07 RX ADMIN — HEPARIN SODIUM 5000 UNIT(S): 5000 INJECTION INTRAVENOUS; SUBCUTANEOUS at 21:12

## 2022-10-07 RX ADMIN — HEPARIN SODIUM 5000 UNIT(S): 5000 INJECTION INTRAVENOUS; SUBCUTANEOUS at 05:25

## 2022-10-07 RX ADMIN — HEPARIN SODIUM 5000 UNIT(S): 5000 INJECTION INTRAVENOUS; SUBCUTANEOUS at 13:31

## 2022-10-07 RX ADMIN — Medication 81 MILLIGRAM(S): at 13:31

## 2022-10-07 RX ADMIN — LISINOPRIL 2.5 MILLIGRAM(S): 2.5 TABLET ORAL at 06:24

## 2022-10-07 RX ADMIN — CLOPIDOGREL BISULFATE 600 MILLIGRAM(S): 75 TABLET, FILM COATED ORAL at 05:25

## 2022-10-07 RX ADMIN — CHLORHEXIDINE GLUCONATE 1 APPLICATION(S): 213 SOLUTION TOPICAL at 05:39

## 2022-10-07 NOTE — DISCHARGE NOTE PROVIDER - HOSPITAL COURSE
52M with no known prior hx who presented with chest pain, found to have AWSTEMI, s/p LHC with 99% pLAD s/p DESx1, D1 60%, D2 70%. TTE 51%: normal LVSF, no segmental WMA. He was started on ASA, Plavix, atorvastatin, losartan and metoprolol. He is medically optimized for discharge with outpatient medicine and cardiology follow up.

## 2022-10-07 NOTE — PROGRESS NOTE ADULT - ASSESSMENT
====================ASSESSMENT ==============  52M Mongolian speaker with no known pmhx who presented with chest pain found to have AWSTEMI, s/p LHC with 99% pLAD s/p DESx1, D1 60%, D2 70%.    Plan:  ====================== NEUROLOGY=====================  #No active issues:  AOx3 at baseline    ==================== RESPIRATORY======================  # No active issues:  - stable on RA, O2sat 98%    ====================CARDIOVASCULAR==================  #AWSTEMI  - s/p LHC 10/6 with 99% pLAD s/p DESx1, D1 60%, D2 70%.  - ASA and Plavix/ discontinue Brilinta  - TTE ordered  - Atorvastatin 80  - BB: Lopressor 25mg BID, lisinopril 2.5  ===================HEMATOLOGIC/ONC ===================  #DVT ppx:   - continue HSQ  ===================== RENAL =========================  #No active issues:  - SCr wnl, 0.95 on arrival to CICU  - monitor UOP   ==================== GASTROINTESTINAL===================  #Diet:  - DASH diet    =======================    ENDOCRINE  =====================  #No active issues:  - A1c 5.8 and TSH wnl  -     ========================INFECTIOUS DISEASE================  #No active issues:  - afebrile w/o leukocytosis  - continue to monitor/trend temps/WBC

## 2022-10-07 NOTE — DISCHARGE NOTE PROVIDER - NSDCFUADDAPPT_GEN_ALL_CORE_FT
Primary care:  10/20 3:30 Dr. Carrera  [facility]  Phone: [phone]  [address] [place] [address], [address]  [place], [place] [id number] [place] [address] Cardiology  10/12 3:30 pm Dr. Brewster  Phone: [phone]   [address] [place] [address], 1st [place] [address]

## 2022-10-07 NOTE — DISCHARGE NOTE PROVIDER - NSDCCPCAREPLAN_GEN_ALL_CORE_FT
PRINCIPAL DISCHARGE DIAGNOSIS  Diagnosis: STEMI (ST elevation myocardial infarction)  Assessment and Plan of Treatment: You had a heart attack and were taken for a procedure called a heart catheterization, where you were found to have blockages in the vessels that supply your heart. A stent was placed to relieve the major blockage. An echocardiogram, which is an ultrasound of your heart, showed that your heart is pumping well. You will need to take aspirin and clopidogrel (Plavix) daily until your cardiologist says otherwise. This is extremely important to prevent your stents from closing back up. You will also need to take the blood pressure medicines losartan and metoprolol and the cholesterol medication atorvastatin to protect your heart.  Please return to the hospital if you develop recurrent chest pain.

## 2022-10-07 NOTE — DISCHARGE NOTE PROVIDER - NSDCFUSCHEDAPPT_GEN_ALL_CORE_FT
[facility] Physician [facility]  CARDIOLOGY [address]  Scheduled Appointment: 10/12/2022    [facility]  INTMED  [facility]   Scheduled Appointment: 10/20/2022

## 2022-10-07 NOTE — PROGRESS NOTE ADULT - SUBJECTIVE AND OBJECTIVE BOX
AMALIA MERCHANT  MRN-[id number]  Patient is a 52y old  Male who presents with a chief complaint of STEMI (07 Oct 2022 15:40)    HPI:  52M Czech speaker with no known pmhx who initially presented to [facility] for b/l chest pain x 2 days which radiated down b/l upper arms, found to have AWSTEMI, transferred to [facility] for LHC. S/p LHC with 99% pLAD s/p DESx1, D1 60%, D2 70%. He denies ever experiencing sx like this before. Denies any residual CP. No SOB. Uninsured and has not seen a doctor in years. No PCP.  (06 Oct 2022 18:16)      Hospital Course:  10/6 Transferred to CCU for further management     24 HOUR EVENTS:    REVIEW OF SYSTEMS:  CONSTITUTIONAL: No weakness, fevers or chills  EYES/ENT: No visual changes;  No vertigo or throat pain   NECK: No pain or stiffness  RESPIRATORY: No cough, wheezing, hemoptysis; No shortness of breath  CARDIOVASCULAR: No chest pain or palpitations  GASTROINTESTINAL: No abdominal or epigastric pain. No nausea, vomiting, or hematemesis; No diarrhea or constipation. No melena or hematochezia.  GENITOURINARY: No dysuria, frequency or hematuria  NEUROLOGICAL: No numbness or weakness  SKIN: No itching, rashes      ICU Vital Signs Last 24 Hrs  T(C): 36.6 (07 Oct 2022 20:00), Max: 36.9 (07 Oct 2022 08:00)  T(F): 97.8 (07 Oct 2022 20:00), Max: 98.5 (07 Oct 2022 08:00)  HR: 60 (07 Oct 2022 22:00) (60 - 82)  BP: 100/59 (07 Oct 2022 22:00) (86/59 - 163/65)  BP(mean): 76 (07 Oct 2022 22:00) (68 - 109)  ABP: --  ABP(mean): --  RR: 14 (07 Oct 2022 22:00) (12 - 23)  SpO2: 96% (07 Oct 2022 22:00) (96% - 99%)    O2 Parameters below as of 07 Oct 2022 23:00  Patient On (Oxygen Delivery Method): room air         I&O's Summary    06 Oct 2022 07:01  -  07 Oct 2022 07:00  --------------------------------------------------------  IN: 120 mL / OUT: 1350 mL / NET: -1230 mL    07 Oct 2022 07:01  -  07 Oct 2022 22:40  --------------------------------------------------------  IN: 720 mL / OUT: 1150 mL / NET: -430 mL         PHYSICAL EXAM:  GENERAL: No acute distress, well-developed  HEAD:  Atraumatic, Normocephalic  EYES: EOMI, PERRLA, conjunctiva and sclera clear  NECK: Supple, no lymphadenopathy, no JVD  CHEST/LUNG: CTAB; No wheezes, rales, or rhonchi  HEART: Regular rate and rhythm. Normal S1/S2. No murmurs, rubs, or gallops  ABDOMEN: Soft, non-tender, non-distended; normal bowel sounds, no organomegaly  EXTREMITIES:  2+ peripheral pulses b/l, No clubbing, cyanosis, or edema  NEUROLOGY: A&O x 3, no focal deficits  SKIN: No rashes or lesions    ============================I/O===========================   I&O's Detail    06 Oct 2022 07:01  -  07 Oct 2022 07:00  --------------------------------------------------------  IN:    Oral Fluid: 120 mL  Total IN: 120 mL    OUT:    Voided (mL): 1350 mL  Total OUT: 1350 mL    Total NET: -1230 mL      07 Oct 2022 07:01  -  07 Oct 2022 22:40  --------------------------------------------------------  IN:    Oral Fluid: 720 mL  Total IN: 720 mL    OUT:    Voided (mL): 1150 mL  Total OUT: 1150 mL    Total NET: -430 mL        ============================ LABS =========================                        15.9   7.36  )-----------( 220      ( 07 Oct 2022 03:51 )             46.3     10-07    140  |  104  |  15  ----------------------------<  103<H>  4.1   |  27  |  0.94    Ca    9.5      07 Oct 2022 03:51  Phos  3.9     10-07  Mg     2.2     10-07    TPro  7.8  /  Alb  4.7  /  TBili  0.5  /  DBili  x   /  AST  29  /  ALT  36  /  AlkPhos  66  10-07    Troponin T, High Sensitivity Result: 207 ng/L (10-07-22 @ 20:32)  Troponin T, High Sensitivity Result: 166 ng/L (10-07-22 @ 10:20)    CKMB Units: 6.9 ng/mL (10-07-22 @ 20:32)  CKMB Units: 12.4 ng/mL (10-07-22 @ 10:20)    Creatine Kinase, Serum: 242 U/L (10-07-22 @ 20:32)  Creatine Kinase, Serum: 288 U/L (10-07-22 @ 10:20)    CPK Mass Assay %: 2.9 % (10-07-22 @ 20:32)  CPK Mass Assay %: 4.3 % (10-07-22 @ 10:20)        LIVER FUNCTIONS - ( 07 Oct 2022 03:51 )  Alb: 4.7 g/dL / Pro: 7.8 g/dL / ALK PHOS: 66 U/L / ALT: 36 U/L / AST: 29 U/L / GGT: x           PT/INR - ( 07 Oct 2022 03:51 )   PT: 11.4 sec;   INR: 0.99 ratio         PTT - ( 07 Oct 2022 03:51 )  PTT:30.5 sec      ======================Micro/Rad/Cardio=================  Telemetry: Reviewed   EKG: Reviewed  CXR: Reviewed  ======================================================  PAST MEDICAL & SURGICAL HISTORY:  No pertinent past medical history      No significant past surgical history        ====================ASSESSMENT ==============  52M Czech speaker with no known pmhx who presented with chest pain found to have AWSTEMI, s/p LHC with 99% pLAD s/p DESx1, D1 60%, D2 70%.    Plan:  ====================== NEUROLOGY=====================  No acute issues   - AOx3 at baseline  - continue close monitoring of neuro status     ==================== RESPIRATORY======================  No acute issues   - stable on RA   - continue pulse ox monitoring, follow ABGs       ====================CARDIOVASCULAR==================  AWSTEMI  - s/p LHC 10/6 with 99% pLAD s/p DESx1, D1 60%, D2 70%.  - TTE on 10/7: EF 51%, normal BiV function   - discontinued Brilinta  - DAPT with ASA + Plavix / Atorvastatin 80  - Dc;d Lopressor 25mg BID, lisinopril 2.5    aspirin enteric coated 81 milliGRAM(s) Oral daily  atorvastatin 80 milliGRAM(s) Oral at bedtime    ===================HEMATOLOGIC/ONC ===================  DVT ppx:   - continue HSQ  - monitor H&H/Plts     heparin   Injectable 5000 Unit(s) SubCutaneous every 8 hours    ===================== RENAL =========================  No acute issues  - SCr wnl, 0.95 on arrival to CICU  - continue to monitor I&Os, BUN/Cr, and UOP   - replete lytes PRN, keep K>4 and Mg>2     ==================== GASTROINTESTINAL===================  No acute issues   - tolerating DASH diet     =======================    ENDOCRINE  =====================  No acute issues    - A1c 5.8 and TSH wnl  -   - continue to monitor blood glucose closely for need to initiate sliding scale     ========================INFECTIOUS DISEASE================  No acute issues   - afebrile, WBC within normal limits   - continue to monitor WBC and fever curve         Patient requires continuous monitoring with bedside rhythm monitoring, pulse ox monitoring, and intermittent blood gas analysis. Care plan discussed with ICU care team. Patient remained critical and at risk for life threatening decompensation.  Patient seen, examined and plan discussed with CCU team during rounds.     I have personally provided ____ minutes of critical care time excluding time spent on separate procedures, in addition to initial critical care time provided by the CICU Attending, Dr. Tanner    By signing my name below, I, Nathalie Antonio, attest that this documentation has been prepared under the direction and in the presence of CEDRIC Rizzo   Electronically signed: Alexus Grande, 10-07-22 @ 22:40    I, Kasia Loomis, personally performed the services described in this documentation. all medical record entries made by the alexus were at my direction and in my presence. I have reviewed the chart and agree that the record reflects my personal performance and is accurate and complete  Electronically signed: CEDRIC Rizzo        AMALIA MERCHANT  MRN-[id number]  Patient is a 52y old  Male who presents with a chief complaint of STEMI (07 Oct 2022 15:40)    HPI:  52M Romansh speaker with no known pmhx who initially presented to [facility] for b/l chest pain x 2 days which radiated down b/l upper arms, found to have AWSTEMI, transferred to [facility] for LHC. S/p LHC with 99% pLAD s/p DESx1, D1 60%, D2 70%. He denies ever experiencing sx like this before. Denies any residual CP. No SOB. Uninsured and has not seen a doctor in years. No PCP.  (06 Oct 2022 18:16)      REVIEW OF SYSTEMS:  CONSTITUTIONAL: No weakness, fevers or chills  EYES/ENT: No visual changes;  No vertigo or throat pain   NECK: No pain or stiffness  RESPIRATORY: No cough, wheezing, hemoptysis; No shortness of breath  CARDIOVASCULAR: No chest pain or palpitations  GASTROINTESTINAL: No abdominal or epigastric pain. No nausea, vomiting, or hematemesis; No diarrhea or constipation. No melena or hematochezia.  GENITOURINARY: No dysuria, frequency or hematuria  NEUROLOGICAL: No numbness or weakness  SKIN: No itching, rashes      ICU Vital Signs Last 24 Hrs  T(C): 36.6 (07 Oct 2022 20:00), Max: 36.9 (07 Oct 2022 08:00)  T(F): 97.8 (07 Oct 2022 20:00), Max: 98.5 (07 Oct 2022 08:00)  HR: 60 (07 Oct 2022 22:00) (60 - 82)  BP: 100/59 (07 Oct 2022 22:00) (86/59 - 163/65)  BP(mean): 76 (07 Oct 2022 22:00) (68 - 109)  ABP: --  ABP(mean): --  RR: 14 (07 Oct 2022 22:00) (12 - 23)  SpO2: 96% (07 Oct 2022 22:00) (96% - 99%)    O2 Parameters below as of 07 Oct 2022 23:00  Patient On (Oxygen Delivery Method): room air         I&O's Summary    06 Oct 2022 07:01  -  07 Oct 2022 07:00  --------------------------------------------------------  IN: 120 mL / OUT: 1350 mL / NET: -1230 mL    07 Oct 2022 07:01  -  07 Oct 2022 22:40  --------------------------------------------------------  IN: 720 mL / OUT: 1150 mL / NET: -430 mL           ============================I/O===========================   I&O's Detail    06 Oct 2022 07:01  -  07 Oct 2022 07:00  --------------------------------------------------------  IN:    Oral Fluid: 120 mL  Total IN: 120 mL    OUT:    Voided (mL): 1350 mL  Total OUT: 1350 mL    Total NET: -1230 mL      07 Oct 2022 07:01  -  07 Oct 2022 22:40  --------------------------------------------------------  IN:    Oral Fluid: 720 mL  Total IN: 720 mL    OUT:    Voided (mL): 1150 mL  Total OUT: 1150 mL    Total NET: -430 mL        ============================ LABS =========================                        15.9   7.36  )-----------( 220      ( 07 Oct 2022 03:51 )             46.3     10-07    140  |  104  |  15  ----------------------------<  103<H>  4.1   |  27  |  0.94    Ca    9.5      07 Oct 2022 03:51  Phos  3.9     10-07  Mg     2.2     10-07    TPro  7.8  /  Alb  4.7  /  TBili  0.5  /  DBili  x   /  AST  29  /  ALT  36  /  AlkPhos  66  10-07    Troponin T, High Sensitivity Result: 207 ng/L (10-07-22 @ 20:32)  Troponin T, High Sensitivity Result: 166 ng/L (10-07-22 @ 10:20)    CKMB Units: 6.9 ng/mL (10-07-22 @ 20:32)  CKMB Units: 12.4 ng/mL (10-07-22 @ 10:20)    Creatine Kinase, Serum: 242 U/L (10-07-22 @ 20:32)  Creatine Kinase, Serum: 288 U/L (10-07-22 @ 10:20)    CPK Mass Assay %: 2.9 % (10-07-22 @ 20:32)  CPK Mass Assay %: 4.3 % (10-07-22 @ 10:20)        LIVER FUNCTIONS - ( 07 Oct 2022 03:51 )  Alb: 4.7 g/dL / Pro: 7.8 g/dL / ALK PHOS: 66 U/L / ALT: 36 U/L / AST: 29 U/L / GGT: x           PT/INR - ( 07 Oct 2022 03:51 )   PT: 11.4 sec;   INR: 0.99 ratio         PTT - ( 07 Oct 2022 03:51 )  PTT:30.5 sec      ======================Micro/Rad/Cardio=================  Telemetry: Reviewed   EKG: Reviewed  CXR: Reviewed  ======================================================  PAST MEDICAL & SURGICAL HISTORY:  No pertinent past medical history      No significant past surgical history        ====================ASSESSMENT ==============  52M Romansh speaker with no known pmhx who presented with chest pain found to have AWSTEMI, s/p LHC with 99% pLAD s/p DESx1, D1 60%, D2 70%.    Plan:  ====================== NEUROLOGY=====================  AOx3, nonfocal deficits   - continue close monitoring of neuro status     ==================== RESPIRATORY======================  Comfortable on RA   - continue pulse ox monitoring, goal SpO2 >92%       ====================CARDIOVASCULAR==================  AWSTEMI  - s/p LHC 10/6 with 99% pLAD s/p DESx1, D1 60%, D2 70%.  - TTE on 10/7: EF 51%, normal BiV function   - discontinued Brilinta and transitioned to Plavix   - c/w DAPT: ASA + Plavix   - c/w Atorvastatin 80  - c/w toprol xL for gdmt  - c/w losartan for gdmt     aspirin enteric coated 81 milliGRAM(s) Oral daily  atorvastatin 80 milliGRAM(s) Oral at bedtime    ===================HEMATOLOGIC/ONC ===================  H/H and plts wnl   - DVT ppx: continue HSQ  - monitor H&H/Plts     heparin   Injectable 5000 Unit(s) SubCutaneous every 8 hours    ===================== RENAL =========================   SCr wnl  - continue to monitor I&Os, BUN/Cr, and UOP   - replete lytes PRN, keep K>4 and Mg>2     ==================== GASTROINTESTINAL===================  tolerating DASH diet   - monitor for regular BM while inpatient     =======================    ENDOCRINE  =====================  No acute issues    - A1c 5.8 and TSH wnl  -   - continue to monitor blood glucose closely for need to initiate sliding scale. BG goal 120-180     ========================INFECTIOUS DISEASE================  afebrile, WBC within normal limits   - continue to monitor WBC and fever curve         Patient requires continuous monitoring with bedside rhythm monitoring, pulse ox monitoring, and intermittent blood gas analysis. Care plan discussed with ICU care team. Patient remained critical and at risk for life threatening decompensation.  Patient seen, examined and plan discussed with CCU team during rounds.     I have personally provided _35___ minutes of critical care time excluding time spent on separate procedures, in addition to initial critical care time provided by the CICU Attending, Dr. Tanner    By signing my name below, I, Nathalie Antonio, attest that this documentation has been prepared under the direction and in the presence of CEDRIC Rizzo   Electronically signed: Alexus Grande, 10-07-22 @ 22:40    I, Kasia Loomis, personally performed the services described in this documentation. all medical record entries made by the alexus were at my direction and in my presence. I have reviewed the chart and agree that the record reflects my personal performance and is accurate and complete  Electronically signed: CEDRIC Rizzo

## 2022-10-07 NOTE — DISCHARGE NOTE PROVIDER - NSDCMRMEDTOKEN_GEN_ALL_CORE_FT
aspirin 81 mg oral delayed release tablet: 1 tab(s) orally once a day  atorvastatin 80 mg oral tablet: 1 tab(s) orally once a day (at bedtime)  clopidogrel 75 mg oral tablet: 1 tab(s) orally once a day  losartan 25 mg oral tablet: 1 tab(s) orally once a day  metoprolol succinate 50 mg oral tablet, extended release: 1 tab(s) orally once a day

## 2022-10-07 NOTE — PROGRESS NOTE ADULT - SUBJECTIVE AND OBJECTIVE BOX
Lakeshia Rogel MD    Internal Medicine Resident (PGY-2)    PATIENT: AMALIA MERCHANT, MRN: [id number]    CHIEF COMPLAINT: Patient is a 52y old  Male who presents with a chief complaint of STEMI (06 Oct 2022 20:24)      INTERVAL HISTORY/OVERNIGHT EVENTS:  Lopressor increased to 25. Started on lisinopril.  Switched to plavix this AM.    MEDICATIONS:  MEDICATIONS  (STANDING):  aspirin enteric coated 81 milliGRAM(s) Oral daily  atorvastatin 80 milliGRAM(s) Oral at bedtime  chlorhexidine 4% Liquid 1 Application(s) Topical <User Schedule>  heparin   Injectable 5000 Unit(s) SubCutaneous every 8 hours  influenza   Vaccine 0.5 milliLiter(s) IntraMuscular once  lisinopril 2.5 milliGRAM(s) Oral daily  metoprolol tartrate 25 milliGRAM(s) Oral two times a day    MEDICATIONS  (PRN):      ALLERGIES: Allergies    No Known Allergies    Intolerances        OBJECTIVE:  ICU Vital Signs Last 24 Hrs  T(C): 36.6 (07 Oct 2022 04:00), Max: 36.7 (07 Oct 2022 00:00)  T(F): 97.9 (07 Oct 2022 04:00), Max: 98 (07 Oct 2022 00:00)  HR: 61 (07 Oct 2022 07:00) (61 - 88)  BP: 124/77 (07 Oct 2022 07:00) (109/65 - 163/65)  BP(mean): 96 (07 Oct 2022 07:00) (83 - 113)  ABP: --  ABP(mean): --  RR: 15 (07 Oct 2022 07:00) (12 - 29)  SpO2: 97% (07 Oct 2022 07:00) (96% - 99%)    O2 Parameters below as of 07 Oct 2022 07:00  Patient On (Oxygen Delivery Method): room air            Adult Advanced Hemodynamics Last 24 Hrs  CVP(mm Hg): --  CVP(cm H2O): --  CO: --  CI: --  PA: --  PA(mean): --  PCWP: --  SVR: --  SVRI: --  PVR: --  PVRI: --  CAPILLARY BLOOD GLUCOSE        CAPILLARY BLOOD GLUCOSE        I&O's Summary    06 Oct 2022 07:01  -  07 Oct 2022 07:00  --------------------------------------------------------  IN: 120 mL / OUT: 1350 mL / NET: -1230 mL      Daily Height in cm: 175.26 (06 Oct 2022 18:00)    Daily Weight in k.6 (06 Oct 2022 18:00)    PHYSICAL EXAMINATION:  General: Comfortable, no acute distress, cooperative with exam.  HEENT: EOMI, moist mucous membranes.  Respiratory: CTAB, normal respiratory effort, no coughing, wheezes, crackles, or rales.  CV: RRR, S1S2, no murmurs, rubs or gallops. No JVD. Distal pulses intact.  Abdominal: Soft, nontender, nondistended, no rebound or guarding, normal bowel sounds.  Neurology: AOx3, no focal neuro defects, QUESADA x 4.  Extremities: No pitting edema, + Peripheral pulses.    LABS:                          15.9   7.36  )-----------( 220      ( 07 Oct 2022 03:51 )             46.3     10-07    140  |  104  |  15  ----------------------------<  103<H>  4.1   |  27  |  0.94    Ca    9.5      07 Oct 2022 03:51  Phos  3.9     10-07  Mg     2.2     10-07    TPro  7.8  /  Alb  4.7  /  TBili  0.5  /  DBili  x   /  AST  29  /  ALT  36  /  AlkPhos  66  10-07    LIVER FUNCTIONS - ( 07 Oct 2022 03:51 )  Alb: 4.7 g/dL / Pro: 7.8 g/dL / ALK PHOS: 66 U/L / ALT: 36 U/L / AST: 29 U/L / GGT: x           PT/INR - ( 07 Oct 2022 03:51 )   PT: 11.4 sec;   INR: 0.99 ratio         PTT - ( 07 Oct 2022 03:51 )  PTT:30.5 sec  CKMB Units: 10.6 ng/mL (10-07 @ 03:51)  Creatine Kinase, Serum: 291 U/L (10-07 @ 03:51)  CKMB Units: 3.5 ng/mL (10-06 @ 18:42)  Creatine Kinase, Serum: 224 U/L (10-06 @ 18:42)    CARDIAC MARKERS ( 07 Oct 2022 03:51 )  x     / x     / 291 U/L / x     / 10.6 ng/mL  CARDIAC MARKERS ( 06 Oct 2022 18:42 )  x     / x     / 224 U/L / x     / 3.5 ng/mL          TELEMETRY:     EKG:     IMAGING:

## 2022-10-08 ENCOUNTER — TRANSCRIPTION ENCOUNTER (OUTPATIENT)
Age: 52
End: 2022-10-08

## 2022-10-08 VITALS
DIASTOLIC BLOOD PRESSURE: 69 MMHG | HEART RATE: 73 BPM | SYSTOLIC BLOOD PRESSURE: 113 MMHG | TEMPERATURE: 98 F | RESPIRATION RATE: 18 BRPM | OXYGEN SATURATION: 99 %

## 2022-10-08 LAB
ALBUMIN SERPL ELPH-MCNC: 4.2 G/DL — SIGNIFICANT CHANGE UP (ref 3.3–5)
ALBUMIN SERPL ELPH-MCNC: 4.5 G/DL — SIGNIFICANT CHANGE UP (ref 3.3–5)
ALP SERPL-CCNC: 19 U/L — LOW (ref 40–120)
ALP SERPL-CCNC: 70 U/L — SIGNIFICANT CHANGE UP (ref 40–120)
ALT FLD-CCNC: 35 U/L — SIGNIFICANT CHANGE UP (ref 10–45)
ALT FLD-CCNC: 37 U/L — SIGNIFICANT CHANGE UP (ref 10–45)
ANION GAP SERPL CALC-SCNC: 11 MMOL/L — SIGNIFICANT CHANGE UP (ref 5–17)
ANION GAP SERPL CALC-SCNC: 22 MMOL/L — HIGH (ref 5–17)
AST SERPL-CCNC: 31 U/L — SIGNIFICANT CHANGE UP (ref 10–40)
AST SERPL-CCNC: 31 U/L — SIGNIFICANT CHANGE UP (ref 10–40)
BILIRUB SERPL-MCNC: 0.6 MG/DL — SIGNIFICANT CHANGE UP (ref 0.2–1.2)
BILIRUB SERPL-MCNC: 0.7 MG/DL — SIGNIFICANT CHANGE UP (ref 0.2–1.2)
BLD GP AB SCN SERPL QL: NEGATIVE — SIGNIFICANT CHANGE UP
BUN SERPL-MCNC: 17 MG/DL — SIGNIFICANT CHANGE UP (ref 7–23)
BUN SERPL-MCNC: 17 MG/DL — SIGNIFICANT CHANGE UP (ref 7–23)
CALCIUM SERPL-MCNC: 9.3 MG/DL — SIGNIFICANT CHANGE UP (ref 8.4–10.5)
CALCIUM SERPL-MCNC: <3 MG/DL — CRITICAL LOW (ref 8.4–10.5)
CHLORIDE SERPL-SCNC: 100 MMOL/L — SIGNIFICANT CHANGE UP (ref 96–108)
CHLORIDE SERPL-SCNC: 104 MMOL/L — SIGNIFICANT CHANGE UP (ref 96–108)
CO2 SERPL-SCNC: 15 MMOL/L — LOW (ref 22–31)
CO2 SERPL-SCNC: 24 MMOL/L — SIGNIFICANT CHANGE UP (ref 22–31)
CREAT SERPL-MCNC: 0.94 MG/DL — SIGNIFICANT CHANGE UP (ref 0.5–1.3)
CREAT SERPL-MCNC: 1.05 MG/DL — SIGNIFICANT CHANGE UP (ref 0.5–1.3)
EGFR: 85 ML/MIN/1.73M2 — SIGNIFICANT CHANGE UP
EGFR: 98 ML/MIN/1.73M2 — SIGNIFICANT CHANGE UP
GLUCOSE SERPL-MCNC: 115 MG/DL — HIGH (ref 70–99)
GLUCOSE SERPL-MCNC: 132 MG/DL — HIGH (ref 70–99)
HCT VFR BLD CALC: 44.9 % — SIGNIFICANT CHANGE UP (ref 39–50)
HCT VFR BLD CALC: 45.5 % — SIGNIFICANT CHANGE UP (ref 39–50)
HGB BLD-MCNC: 15.7 G/DL — SIGNIFICANT CHANGE UP (ref 13–17)
HGB BLD-MCNC: 15.8 G/DL — SIGNIFICANT CHANGE UP (ref 13–17)
MAGNESIUM SERPL-MCNC: 1.9 MG/DL — SIGNIFICANT CHANGE UP (ref 1.6–2.6)
MAGNESIUM SERPL-MCNC: <.6 MG/DL — CRITICAL LOW (ref 1.6–2.6)
MCHC RBC-ENTMCNC: 30.7 PG — SIGNIFICANT CHANGE UP (ref 27–34)
MCHC RBC-ENTMCNC: 30.9 PG — SIGNIFICANT CHANGE UP (ref 27–34)
MCHC RBC-ENTMCNC: 34.5 GM/DL — SIGNIFICANT CHANGE UP (ref 32–36)
MCHC RBC-ENTMCNC: 35.2 GM/DL — SIGNIFICANT CHANGE UP (ref 32–36)
MCV RBC AUTO: 87.9 FL — SIGNIFICANT CHANGE UP (ref 80–100)
MCV RBC AUTO: 88.9 FL — SIGNIFICANT CHANGE UP (ref 80–100)
NRBC # BLD: 0 /100 WBCS — SIGNIFICANT CHANGE UP (ref 0–0)
NRBC # BLD: 0 /100 WBCS — SIGNIFICANT CHANGE UP (ref 0–0)
PHOSPHATE SERPL-MCNC: 3.4 MG/DL — SIGNIFICANT CHANGE UP (ref 2.5–4.5)
PHOSPHATE SERPL-MCNC: 3.9 MG/DL — SIGNIFICANT CHANGE UP (ref 2.5–4.5)
PLATELET # BLD AUTO: 217 K/UL — SIGNIFICANT CHANGE UP (ref 150–400)
PLATELET # BLD AUTO: 221 K/UL — SIGNIFICANT CHANGE UP (ref 150–400)
POTASSIUM SERPL-MCNC: 4.8 MMOL/L — SIGNIFICANT CHANGE UP (ref 3.5–5.3)
POTASSIUM SERPL-MCNC: >9 MMOL/L — CRITICAL HIGH (ref 3.5–5.3)
POTASSIUM SERPL-SCNC: 4.8 MMOL/L — SIGNIFICANT CHANGE UP (ref 3.5–5.3)
POTASSIUM SERPL-SCNC: >9 MMOL/L — CRITICAL HIGH (ref 3.5–5.3)
PROT SERPL-MCNC: 7 G/DL — SIGNIFICANT CHANGE UP (ref 6–8.3)
PROT SERPL-MCNC: 7.5 G/DL — SIGNIFICANT CHANGE UP (ref 6–8.3)
RBC # BLD: 5.11 M/UL — SIGNIFICANT CHANGE UP (ref 4.2–5.8)
RBC # BLD: 5.12 M/UL — SIGNIFICANT CHANGE UP (ref 4.2–5.8)
RBC # FLD: 11.9 % — SIGNIFICANT CHANGE UP (ref 10.3–14.5)
RBC # FLD: 11.9 % — SIGNIFICANT CHANGE UP (ref 10.3–14.5)
RH IG SCN BLD-IMP: POSITIVE — SIGNIFICANT CHANGE UP
SODIUM SERPL-SCNC: 137 MMOL/L — SIGNIFICANT CHANGE UP (ref 135–145)
SODIUM SERPL-SCNC: 139 MMOL/L — SIGNIFICANT CHANGE UP (ref 135–145)
WBC # BLD: 7.21 K/UL — SIGNIFICANT CHANGE UP (ref 3.8–10.5)
WBC # BLD: 8.17 K/UL — SIGNIFICANT CHANGE UP (ref 3.8–10.5)
WBC # FLD AUTO: 7.21 K/UL — SIGNIFICANT CHANGE UP (ref 3.8–10.5)
WBC # FLD AUTO: 8.17 K/UL — SIGNIFICANT CHANGE UP (ref 3.8–10.5)

## 2022-10-08 PROCEDURE — 80061 LIPID PANEL: CPT

## 2022-10-08 PROCEDURE — C1769: CPT

## 2022-10-08 PROCEDURE — 85025 COMPLETE CBC W/AUTO DIFF WBC: CPT

## 2022-10-08 PROCEDURE — 93005 ELECTROCARDIOGRAM TRACING: CPT

## 2022-10-08 PROCEDURE — 86900 BLOOD TYPING SEROLOGIC ABO: CPT

## 2022-10-08 PROCEDURE — C1874: CPT

## 2022-10-08 PROCEDURE — 85730 THROMBOPLASTIN TIME PARTIAL: CPT

## 2022-10-08 PROCEDURE — 86850 RBC ANTIBODY SCREEN: CPT

## 2022-10-08 PROCEDURE — 93306 TTE W/DOPPLER COMPLETE: CPT

## 2022-10-08 PROCEDURE — 36415 COLL VENOUS BLD VENIPUNCTURE: CPT

## 2022-10-08 PROCEDURE — 84443 ASSAY THYROID STIM HORMONE: CPT

## 2022-10-08 PROCEDURE — C1887: CPT

## 2022-10-08 PROCEDURE — 99239 HOSP IP/OBS DSCHRG MGMT >30: CPT

## 2022-10-08 PROCEDURE — C1725: CPT

## 2022-10-08 PROCEDURE — 83036 HEMOGLOBIN GLYCOSYLATED A1C: CPT

## 2022-10-08 PROCEDURE — 83735 ASSAY OF MAGNESIUM: CPT

## 2022-10-08 PROCEDURE — 84100 ASSAY OF PHOSPHORUS: CPT

## 2022-10-08 PROCEDURE — 93010 ELECTROCARDIOGRAM REPORT: CPT

## 2022-10-08 PROCEDURE — C1894: CPT

## 2022-10-08 PROCEDURE — 80053 COMPREHEN METABOLIC PANEL: CPT

## 2022-10-08 PROCEDURE — 93458 L HRT ARTERY/VENTRICLE ANGIO: CPT | Mod: 59

## 2022-10-08 PROCEDURE — C9606: CPT | Mod: LD

## 2022-10-08 PROCEDURE — 84484 ASSAY OF TROPONIN QUANT: CPT

## 2022-10-08 PROCEDURE — 82553 CREATINE MB FRACTION: CPT

## 2022-10-08 PROCEDURE — 85610 PROTHROMBIN TIME: CPT

## 2022-10-08 PROCEDURE — 83880 ASSAY OF NATRIURETIC PEPTIDE: CPT

## 2022-10-08 PROCEDURE — 85520 HEPARIN ASSAY: CPT

## 2022-10-08 PROCEDURE — 82550 ASSAY OF CK (CPK): CPT

## 2022-10-08 PROCEDURE — 86901 BLOOD TYPING SEROLOGIC RH(D): CPT

## 2022-10-08 RX ORDER — ASPIRIN/CALCIUM CARB/MAGNESIUM 324 MG
1 TABLET ORAL
Qty: 30 | Refills: 2
Start: 2022-10-08 | End: 2023-01-05

## 2022-10-08 RX ORDER — ACETAMINOPHEN 500 MG
650 TABLET ORAL ONCE
Refills: 0 | Status: COMPLETED | OUTPATIENT
Start: 2022-10-08 | End: 2022-10-08

## 2022-10-08 RX ORDER — ATORVASTATIN CALCIUM 80 MG/1
1 TABLET, FILM COATED ORAL
Qty: 30 | Refills: 2
Start: 2022-10-08 | End: 2023-01-05

## 2022-10-08 RX ORDER — LOSARTAN POTASSIUM 100 MG/1
1 TABLET, FILM COATED ORAL
Qty: 30 | Refills: 2
Start: 2022-10-08 | End: 2023-01-05

## 2022-10-08 RX ORDER — METOPROLOL TARTRATE 50 MG
1 TABLET ORAL
Qty: 30 | Refills: 2
Start: 2022-10-08 | End: 2023-01-05

## 2022-10-08 RX ORDER — LOSARTAN POTASSIUM 100 MG/1
1 TABLET, FILM COATED ORAL
Qty: 0 | Refills: 0 | DISCHARGE
Start: 2022-10-08

## 2022-10-08 RX ORDER — LOSARTAN POTASSIUM 100 MG/1
25 TABLET, FILM COATED ORAL DAILY
Refills: 0 | Status: DISCONTINUED | OUTPATIENT
Start: 2022-10-08 | End: 2022-10-08

## 2022-10-08 RX ORDER — CLOPIDOGREL BISULFATE 75 MG/1
1 TABLET, FILM COATED ORAL
Qty: 30 | Refills: 2
Start: 2022-10-08 | End: 2023-01-05

## 2022-10-08 RX ADMIN — Medication 50 MILLIGRAM(S): at 05:06

## 2022-10-08 RX ADMIN — CHLORHEXIDINE GLUCONATE 1 APPLICATION(S): 213 SOLUTION TOPICAL at 05:06

## 2022-10-08 RX ADMIN — Medication 81 MILLIGRAM(S): at 11:07

## 2022-10-08 RX ADMIN — CLOPIDOGREL BISULFATE 75 MILLIGRAM(S): 75 TABLET, FILM COATED ORAL at 11:07

## 2022-10-08 RX ADMIN — Medication 650 MILLIGRAM(S): at 05:06

## 2022-10-08 RX ADMIN — Medication 650 MILLIGRAM(S): at 06:09

## 2022-10-08 RX ADMIN — LOSARTAN POTASSIUM 25 MILLIGRAM(S): 100 TABLET, FILM COATED ORAL at 11:07

## 2022-10-08 RX ADMIN — HEPARIN SODIUM 5000 UNIT(S): 5000 INJECTION INTRAVENOUS; SUBCUTANEOUS at 05:06

## 2022-10-08 NOTE — DISCHARGE NOTE NURSING/CASE MANAGEMENT/SOCIAL WORK - NSDCPEFALRISK_GEN_ALL_CORE
For information on Fall & Injury Prevention, visit: [email or web address] OR  [email or web address] OR  https://www.cdc.gov/steadi/patient.html

## 2022-10-08 NOTE — PROGRESS NOTE ADULT - SUBJECTIVE AND OBJECTIVE BOX
Lakeshia Rogel MD    Internal Medicine Resident (PGY-2)    PATIENT: AMALIA MERCHANT, MRN: [id number]    CHIEF COMPLAINT: Patient is a 52y old  Male who presents with a chief complaint of STEMI (07 Oct 2022 22:40)      INTERVAL HISTORY/OVERNIGHT EVENTS: No overnight events.     MEDICATIONS:  MEDICATIONS  (STANDING):  aspirin enteric coated 81 milliGRAM(s) Oral daily  atorvastatin 80 milliGRAM(s) Oral at bedtime  chlorhexidine 4% Liquid 1 Application(s) Topical <User Schedule>  clopidogrel Tablet 75 milliGRAM(s) Oral daily  heparin   Injectable 5000 Unit(s) SubCutaneous every 8 hours  influenza   Vaccine 0.5 milliLiter(s) IntraMuscular once  losartan 50 milliGRAM(s) Oral daily  metoprolol succinate ER 50 milliGRAM(s) Oral daily    MEDICATIONS  (PRN):      ALLERGIES: Allergies    No Known Allergies    Intolerances        OBJECTIVE:  ICU Vital Signs Last 24 Hrs  T(C): 36.6 (08 Oct 2022 07:00), Max: 36.9 (07 Oct 2022 08:00)  T(F): 97.9 (08 Oct 2022 07:00), Max: 98.5 (07 Oct 2022 08:00)  HR: 64 (08 Oct 2022 07:00) (59 - 79)  BP: 118/69 (08 Oct 2022 07:00) (83/66 - 146/85)  BP(mean): 86 (08 Oct 2022 07:00) (66 - 109)  ABP: --  ABP(mean): --  RR: 16 (08 Oct 2022 07:00) (12 - 21)  SpO2: 100% (08 Oct 2022 07:00) (96% - 100%)    O2 Parameters below as of 08 Oct 2022 07:00  Patient On (Oxygen Delivery Method): room air            Adult Advanced Hemodynamics Last 24 Hrs  CVP(mm Hg): --  CVP(cm H2O): --  CO: --  CI: --  PA: --  PA(mean): --  PCWP: --  SVR: --  SVRI: --  PVR: --  PVRI: --  CAPILLARY BLOOD GLUCOSE        CAPILLARY BLOOD GLUCOSE        I&O's Summary    07 Oct 2022 07:01  -  08 Oct 2022 07:00  --------------------------------------------------------  IN: 1080 mL / OUT: 1150 mL / NET: -70 mL      Daily     Daily Weight in k.9 (08 Oct 2022 05:00)    PHYSICAL EXAMINATION:  General: Comfortable, no acute distress, cooperative with exam.  HEENT: PERRLA, EOMI, moist mucous membranes.  Respiratory: CTAB, normal respiratory effort, no coughing, wheezes, crackles, or rales.  CV: RRR, S1S2, no murmurs, rubs or gallops. No JVD. Distal pulses intact.  Abdominal: Soft, nontender, nondistended, no rebound or guarding, normal bowel sounds.  Neurology: AOx3, no focal neuro defects, QUESADA x 4.  Extremities: No pitting edema, + Peripheral pulses.  Incisions:   Tubes:    LABS:                          15.7   8.17  )-----------( 221      ( 08 Oct 2022 06:28 )             45.5     10-08    139  |  104  |  17  ----------------------------<  132<H>  4.8   |  24  |  1.05    Ca    9.3      08 Oct 2022 06:34  Phos  3.4     10-08  Mg     1.9     10-08    TPro  7.5  /  Alb  4.5  /  TBili  0.7  /  DBili  x   /  AST  31  /  ALT  37  /  AlkPhos  70  10-08    LIVER FUNCTIONS - ( 08 Oct 2022 06:34 )  Alb: 4.5 g/dL / Pro: 7.5 g/dL / ALK PHOS: 70 U/L / ALT: 37 U/L / AST: 31 U/L / GGT: x           PT/INR - ( 07 Oct 2022 03:51 )   PT: 11.4 sec;   INR: 0.99 ratio         PTT - ( 07 Oct 2022 03:51 )  PTT:30.5 sec  Creatine Kinase, Serum: 242 U/L (10-07 @ 20:32)  CKMB Units: 6.9 ng/mL (10-07 @ 20:32)  Creatine Kinase, Serum: 288 U/L (10-07 @ 10:20)  CKMB Units: 12.4 ng/mL (10-07 @ 10:20)    CARDIAC MARKERS ( 07 Oct 2022 20:32 )  x     / x     / 242 U/L / x     / 6.9 ng/mL  CARDIAC MARKERS ( 07 Oct 2022 10:20 )  x     / x     / 288 U/L / x     / 12.4 ng/mL  CARDIAC MARKERS ( 07 Oct 2022 03:51 )  x     / x     / 291 U/L / x     / 10.6 ng/mL  CARDIAC MARKERS ( 06 Oct 2022 18:42 )  x     / x     / 224 U/L / x     / 3.5 ng/mL          TELEMETRY:     EKG:     IMAGING:  Lakeshia Rogel MD    Internal Medicine Resident (PGY-2)    PATIENT: AMALIA MERCHANT, MRN: [id number]    CHIEF COMPLAINT: Patient is a 52y old  Male who presents with a chief complaint of STEMI (07 Oct 2022 22:40)      INTERVAL HISTORY/OVERNIGHT EVENTS: No overnight events. Denies CP, SOB.    MEDICATIONS:  MEDICATIONS  (STANDING):  aspirin enteric coated 81 milliGRAM(s) Oral daily  atorvastatin 80 milliGRAM(s) Oral at bedtime  chlorhexidine 4% Liquid 1 Application(s) Topical <User Schedule>  clopidogrel Tablet 75 milliGRAM(s) Oral daily  heparin   Injectable 5000 Unit(s) SubCutaneous every 8 hours  influenza   Vaccine 0.5 milliLiter(s) IntraMuscular once  losartan 50 milliGRAM(s) Oral daily  metoprolol succinate ER 50 milliGRAM(s) Oral daily    MEDICATIONS  (PRN):      ALLERGIES: Allergies    No Known Allergies    Intolerances        OBJECTIVE:  ICU Vital Signs Last 24 Hrs  T(C): 36.6 (08 Oct 2022 07:00), Max: 36.9 (07 Oct 2022 08:00)  T(F): 97.9 (08 Oct 2022 07:00), Max: 98.5 (07 Oct 2022 08:00)  HR: 64 (08 Oct 2022 07:00) (59 - 79)  BP: 118/69 (08 Oct 2022 07:00) (83/66 - 146/85)  BP(mean): 86 (08 Oct 2022 07:00) (66 - 109)  ABP: --  ABP(mean): --  RR: 16 (08 Oct 2022 07:00) (12 - 21)  SpO2: 100% (08 Oct 2022 07:00) (96% - 100%)    O2 Parameters below as of 08 Oct 2022 07:00  Patient On (Oxygen Delivery Method): room air            Adult Advanced Hemodynamics Last 24 Hrs  CVP(mm Hg): --  CVP(cm H2O): --  CO: --  CI: --  PA: --  PA(mean): --  PCWP: --  SVR: --  SVRI: --  PVR: --  PVRI: --  CAPILLARY BLOOD GLUCOSE        CAPILLARY BLOOD GLUCOSE        I&O's Summary    07 Oct 2022 07:01  -  08 Oct 2022 07:00  --------------------------------------------------------  IN: 1080 mL / OUT: 1150 mL / NET: -70 mL      Daily     Daily Weight in k.9 (08 Oct 2022 05:00)    PHYSICAL EXAMINATION:  General: Comfortable, no acute distress, cooperative with exam.  HEENT: EOMI, moist mucous membranes.  Respiratory: CTAB, normal respiratory effort, no coughing, wheezes, crackles, or rales.  CV: RRR, S1S2, no murmurs, rubs or gallops. No JVD. Distal pulses intact.  Abdominal: Soft, nontender, nondistended, no rebound or guarding, normal bowel sounds.  Neurology: AOx3, no focal neuro defects, QUESADA x 4.  Extremities: No pitting edema, + Peripheral pulses.    LABS:                          15.7   8.17  )-----------( 221      ( 08 Oct 2022 06:28 )             45.5     10-08    139  |  104  |  17  ----------------------------<  132<H>  4.8   |  24  |  1.05    Ca    9.3      08 Oct 2022 06:34  Phos  3.4     10-08  Mg     1.9     10-08    TPro  7.5  /  Alb  4.5  /  TBili  0.7  /  DBili  x   /  AST  31  /  ALT  37  /  AlkPhos  70  10-08    LIVER FUNCTIONS - ( 08 Oct 2022 06:34 )  Alb: 4.5 g/dL / Pro: 7.5 g/dL / ALK PHOS: 70 U/L / ALT: 37 U/L / AST: 31 U/L / GGT: x           PT/INR - ( 07 Oct 2022 03:51 )   PT: 11.4 sec;   INR: 0.99 ratio         PTT - ( 07 Oct 2022 03:51 )  PTT:30.5 sec  Creatine Kinase, Serum: 242 U/L (10-07 @ 20:32)  CKMB Units: 6.9 ng/mL (10-07 @ 20:32)  Creatine Kinase, Serum: 288 U/L (10-07 @ 10:20)  CKMB Units: 12.4 ng/mL (10-07 @ 10:20)    CARDIAC MARKERS ( 07 Oct 2022 20:32 )  x     / x     / 242 U/L / x     / 6.9 ng/mL  CARDIAC MARKERS ( 07 Oct 2022 10:20 )  x     / x     / 288 U/L / x     / 12.4 ng/mL  CARDIAC MARKERS ( 07 Oct 2022 03:51 )  x     / x     / 291 U/L / x     / 10.6 ng/mL  CARDIAC MARKERS ( 06 Oct 2022 18:42 )  x     / x     / 224 U/L / x     / 3.5 ng/mL          TELEMETRY:     EKG:     IMAGING:

## 2022-10-08 NOTE — PROGRESS NOTE ADULT - ASSESSMENT
====================ASSESSMENT ==============  52M Maori speaker with no known pmhx who presented with chest pain found to have AWSTEMI, s/p LHC with 99% pLAD s/p DESx1, D1 60%, D2 70%.    Plan:  ====================== NEUROLOGY=====================  #No active issues:  AOx3 at baseline    ==================== RESPIRATORY======================  # No active issues:  - stable on RA, O2sat 98%    ====================CARDIOVASCULAR==================  #AWSTEMI  - s/p LHC 10/6 with 99% pLAD s/p DESx1, D1 60%, D2 70%.  - ASA and Plavix/ discontinue Brilinta  - TTE ordered  - Atorvastatin 80  - BB/ARB: Toprol 50, losartan 50  ===================HEMATOLOGIC/ONC ===================  #DVT ppx:   - continue HSQ  ===================== RENAL =========================  #No active issues:  - SCr wnl  - monitor UOP   ==================== GASTROINTESTINAL===================  #Diet:  - DASH diet    =======================    ENDOCRINE  =====================  #No active issues:  - A1c 5.8 and TSH wnl  -     ========================INFECTIOUS DISEASE================  #No active issues:  - afebrile w/o leukocytosis  - continue to monitor/trend temps/WBC   ====================ASSESSMENT ==============  52M Turkish speaker with no known pmhx who presented with chest pain found to have AWSTEMI, s/p LHC with 99% pLAD s/p DESx1, D1 60%, D2 70%.    Plan:  ====================== NEUROLOGY=====================  #No active issues:  AOx3 at baseline    ==================== RESPIRATORY======================  # No active issues:  - stable on RA, O2sat 98%    ====================CARDIOVASCULAR==================  #AWSTEMI  - s/p LHC 10/6 with 99% pLAD s/p DESx1, D1 60%, D2 70%.  - ASA and Plavix  - TTE: EF 51%, normal LVSF, no WMA  - Atorvastatin 80  - BB/ARB: Toprol 50, losartan 25  ===================HEMATOLOGIC/ONC ===================  #DVT ppx:   - continue HSQ  ===================== RENAL =========================  #No active issues:  - SCr wnl  - monitor UOP   ==================== GASTROINTESTINAL===================  #Diet:  - DASH diet    =======================    ENDOCRINE  =====================  #No active issues:  - A1c 5.8 and TSH wnl  -     ========================INFECTIOUS DISEASE================  #No active issues:  - afebrile w/o leukocytosis  - continue to monitor/trend temps/WBC

## 2022-10-08 NOTE — DISCHARGE NOTE NURSING/CASE MANAGEMENT/SOCIAL WORK - NSDCFUADDAPPT_GEN_ALL_CORE_FT
Primary care:  10/20 3:30 Dr. Carrera  [facility]  Phone: [phone]  [address] [place] [address], [facility] [address]  [place], [place] [address] [place] [address] Cardiology  10/12 3:30 pm Dr. Brewster  Phone: [phone]   [address] [place] [address], 1st [place] [address]

## 2022-10-08 NOTE — DISCHARGE NOTE NURSING/CASE MANAGEMENT/SOCIAL WORK - NSDPLANGINTNAME_GEN_ALL_CORE
Amy Rubio (friend) used for translation; translation services called but no one available that speaks his language of Italian

## 2022-10-08 NOTE — PROGRESS NOTE ADULT - ATTENDING COMMENTS
Anterior wall STEMI involving LAD  Status post emergent PCI  Dual antiplatelet therapy, high intensity statin.  Follow-up TTE  Plan to start beta-blocker, ARB as tolerated
Anterior wall STEMI involving LAD.  Status post emergent PCI.  TTE with preserved LVEF.  Dual antiplatelet therapy, high intensity statin.  Plan to start beta-blocker, ARB as tolerated.

## 2022-10-12 ENCOUNTER — APPOINTMENT (OUTPATIENT)
Dept: CARDIOLOGY | Facility: HOSPITAL | Age: 52
End: 2022-10-12

## 2022-10-12 ENCOUNTER — NON-APPOINTMENT (OUTPATIENT)
Age: 52
End: 2022-10-12

## 2022-10-12 ENCOUNTER — OUTPATIENT (OUTPATIENT)
Dept: OUTPATIENT SERVICES | Facility: HOSPITAL | Age: 52
LOS: 1 days | End: 2022-10-12
Payer: SELF-PAY

## 2022-10-12 VITALS
WEIGHT: 228 LBS | OXYGEN SATURATION: 98 % | BODY MASS INDEX: 33.77 KG/M2 | HEART RATE: 63 BPM | SYSTOLIC BLOOD PRESSURE: 122 MMHG | DIASTOLIC BLOOD PRESSURE: 75 MMHG | HEIGHT: 69 IN

## 2022-10-12 DIAGNOSIS — Z78.9 OTHER SPECIFIED HEALTH STATUS: ICD-10-CM

## 2022-10-12 DIAGNOSIS — I25.10 ATHEROSCLEROTIC HEART DISEASE OF NATIVE CORONARY ARTERY WITHOUT ANGINA PECTORIS: ICD-10-CM

## 2022-10-12 DIAGNOSIS — Z82.49 FAMILY HISTORY OF ISCHEMIC HEART DISEASE AND OTHER DISEASES OF THE CIRCULATORY SYSTEM: ICD-10-CM

## 2022-10-12 DIAGNOSIS — E78.5 HYPERLIPIDEMIA, UNSPECIFIED: ICD-10-CM

## 2022-10-12 DIAGNOSIS — I25.2 OLD MYOCARDIAL INFARCTION: ICD-10-CM

## 2022-10-12 PROCEDURE — 93005 ELECTROCARDIOGRAM TRACING: CPT

## 2022-10-12 PROCEDURE — G0463: CPT

## 2022-10-12 NOTE — REASON FOR VISIT
[FreeTextEntry1] : Mr. Hernández is a Maori (S[place]) speaking 52M who was admitted to [facility] 10/6 for AWSTEMI in the setting of CP x 2 days from [facility]. Patient had LE x 1 to pLAD 99% D1 60% D2 70% and then sent to CICU. Patient had TTE on 10/7 with EF 51% no WMAs mild MR no LV thrombus and no effusion seen. A1C was 5.8% and LDL was 146 on lipid profile. Was discharged on 10/8 with ASA 81, Plavix 75mg, Atorvastatin 80mg, Losartan 25mg, and Metoprolol Succinate 50mg. Patient has friend at bedside who is translating for him. Patient reports doing well has no further CP, SOB, LH or dizziness. Denies any melena. Took an uber to come to the clinic today and had to walk 10 min and reported feeling well. Has been slowly increasing physical activity. He helps with delivering food. Has been in the [place] currently living with friends. No prior hx of smoking. Drinks an occasional beer once every 2-3 weeks. Denies any recreational drug use. He does have a strong FH of CAD with his brother have an MI at the age of 59 and sister at the age of 65.

## 2022-10-12 NOTE — ASSESSMENT
[FreeTextEntry1] : Mr. Hernández is a Telugu (S[place]) speaking 52M who was admitted to [facility] 10/6 for AWSTEMI in the setting of CP x 2 days from Health system s/p LE x 1 to [facility].\par \par #AWSTEMI - EKG has TWI in V1-V2 and aVL resolution of ST elevation currently having no cardiac symptoms\par - Continue ASA 81mg daily\par - Continue Plavix 75mg daily\par - Continue Atorvastatin 80mg daily\par - Continue Metoprolol Succinate 50mg daily\par - Continue Losartan 25mg daily\par - Counselled patient on importance of dietary changes (avoid foods with high saturated fats and have more fruits and vegetables)\par \par Patient is establishing care at [facility] appointment on 10/20\par \par RTC in 3 months will get repeat labs including lipid profile\par \par Case d/w Dr. Courtney\par \par \par Angie James MD PGY-5\par Cardiology Fellow

## 2022-10-13 DIAGNOSIS — I21.09 ST ELEVATION (STEMI) MYOCARDIAL INFARCTION INVOLVING OTHER CORONARY ARTERY OF ANTERIOR WALL: ICD-10-CM

## 2022-10-20 ENCOUNTER — APPOINTMENT (OUTPATIENT)
Dept: INTERNAL MEDICINE | Facility: CLINIC | Age: 52
End: 2022-10-20

## 2022-10-20 ENCOUNTER — OUTPATIENT (OUTPATIENT)
Dept: OUTPATIENT SERVICES | Facility: HOSPITAL | Age: 52
LOS: 1 days | End: 2022-10-20
Payer: SELF-PAY

## 2022-10-20 VITALS
BODY MASS INDEX: 33.77 KG/M2 | HEART RATE: 64 BPM | WEIGHT: 228 LBS | OXYGEN SATURATION: 98 % | HEIGHT: 69 IN | SYSTOLIC BLOOD PRESSURE: 110 MMHG | DIASTOLIC BLOOD PRESSURE: 76 MMHG

## 2022-10-20 DIAGNOSIS — Z82.3 FAMILY HISTORY OF STROKE: ICD-10-CM

## 2022-10-20 DIAGNOSIS — Z82.49 FAMILY HISTORY OF ISCHEMIC HEART DISEASE AND OTHER DISEASES OF THE CIRCULATORY SYSTEM: ICD-10-CM

## 2022-10-20 DIAGNOSIS — Z84.89 FAMILY HISTORY OF OTHER SPECIFIED CONDITIONS: ICD-10-CM

## 2022-10-20 DIAGNOSIS — I10 ESSENTIAL (PRIMARY) HYPERTENSION: ICD-10-CM

## 2022-10-20 PROCEDURE — 80053 COMPREHEN METABOLIC PANEL: CPT

## 2022-10-20 PROCEDURE — ZZZZZ: CPT

## 2022-10-20 PROCEDURE — 80061 LIPID PANEL: CPT

## 2022-10-20 PROCEDURE — 86803 HEPATITIS C AB TEST: CPT

## 2022-10-20 PROCEDURE — 90688 IIV4 VACCINE SPLT 0.5 ML IM: CPT

## 2022-10-20 PROCEDURE — G0008: CPT

## 2022-10-20 PROCEDURE — 90732 PPSV23 VACC 2 YRS+ SUBQ/IM: CPT

## 2022-10-20 PROCEDURE — G0463: CPT | Mod: 25

## 2022-10-20 PROCEDURE — G0009: CPT

## 2022-10-20 PROCEDURE — 85025 COMPLETE CBC W/AUTO DIFF WBC: CPT

## 2022-10-20 PROCEDURE — 83036 HEMOGLOBIN GLYCOSYLATED A1C: CPT

## 2022-10-20 NOTE — PHYSICAL EXAM
[No Varicosities] : no varicosities [Pedal Pulses Present] : the pedal pulses are present [No Edema] : there was no peripheral edema [No Extremity Clubbing/Cyanosis] : no extremity clubbing/cyanosis [Soft] : abdomen soft [Non Tender] : non-tender [Non-distended] : non-distended [No Masses] : no abdominal mass palpated [Normal Bowel Sounds] : normal bowel sounds [Normal] : affect was normal and insight and judgment were intact

## 2022-10-21 ENCOUNTER — NON-APPOINTMENT (OUTPATIENT)
Age: 52
End: 2022-10-21

## 2022-10-21 LAB
ALBUMIN SERPL ELPH-MCNC: 4.6 G/DL
ALP BLD-CCNC: 72 U/L
ALT SERPL-CCNC: 22 U/L
ANION GAP SERPL CALC-SCNC: 12 MMOL/L
AST SERPL-CCNC: 16 U/L
BASOPHILS # BLD AUTO: 0.07 K/UL
BASOPHILS NFR BLD AUTO: 0.8 %
BILIRUB SERPL-MCNC: 0.4 MG/DL
BUN SERPL-MCNC: 20 MG/DL
CALCIUM SERPL-MCNC: 9.4 MG/DL
CHLORIDE SERPL-SCNC: 103 MMOL/L
CHOLEST SERPL-MCNC: 141 MG/DL
CO2 SERPL-SCNC: 26 MMOL/L
CREAT SERPL-MCNC: 1.18 MG/DL
EGFR: 74 ML/MIN/1.73M2
EOSINOPHIL # BLD AUTO: 0.39 K/UL
EOSINOPHIL NFR BLD AUTO: 4.3 %
ESTIMATED AVERAGE GLUCOSE: 128 MG/DL
GLUCOSE SERPL-MCNC: 106 MG/DL
HBA1C MFR BLD HPLC: 6.1 %
HCT VFR BLD CALC: 42.9 %
HCV AB SER QL: NONREACTIVE
HCV S/CO RATIO: 0.1 S/CO
HDLC SERPL-MCNC: 27 MG/DL
HGB BLD-MCNC: 14.6 G/DL
IMM GRANULOCYTES NFR BLD AUTO: 0.2 %
LDLC SERPL CALC-MCNC: 74 MG/DL
LYMPHOCYTES # BLD AUTO: 2.83 K/UL
LYMPHOCYTES NFR BLD AUTO: 31.4 %
MAN DIFF?: NORMAL
MCHC RBC-ENTMCNC: 30.7 PG
MCHC RBC-ENTMCNC: 34 GM/DL
MCV RBC AUTO: 90.1 FL
MONOCYTES # BLD AUTO: 0.86 K/UL
MONOCYTES NFR BLD AUTO: 9.5 %
NEUTROPHILS # BLD AUTO: 4.85 K/UL
NEUTROPHILS NFR BLD AUTO: 53.8 %
NONHDLC SERPL-MCNC: 114 MG/DL
PLATELET # BLD AUTO: 262 K/UL
POTASSIUM SERPL-SCNC: 4.5 MMOL/L
PROT SERPL-MCNC: 7.4 G/DL
RBC # BLD: 4.76 M/UL
RBC # FLD: 11.6 %
SODIUM SERPL-SCNC: 142 MMOL/L
TRIGL SERPL-MCNC: 200 MG/DL
WBC # FLD AUTO: 9.02 K/UL

## 2022-10-21 NOTE — INTERPRETER SERVICES
[Ad Hoc ] : provided by an ad hoc  [FreeTextEntry3] : [employer]\par #[id number]\par Maltese [TWNoteComboBox1] : Other

## 2022-10-21 NOTE — HEALTH RISK ASSESSMENT
[Good] : ~his/her~  mood as  good [Never] : Never [Yes] : Yes [Monthly or less (1 pt)] : Monthly or less (1 point) [1 or 2 (0 pts)] : 1 or 2 (0 points) [Never (0 pts)] : Never (0 points) [No] : In the past 12 months have you used drugs other than those required for medical reasons? No [No falls in past year] : Patient reported no falls in the past year [0] : 2) Feeling down, depressed, or hopeless: Not at all (0) [PHQ-2 Negative - No further assessment needed] : PHQ-2 Negative - No further assessment needed [Single] : single [Feels Safe at Home] : Feels safe at home [Fully functional (bathing, dressing, toileting, transferring, walking, feeding)] : Fully functional (bathing, dressing, toileting, transferring, walking, feeding) [Fully functional (using the telephone, shopping, preparing meals, housekeeping, doing laundry, using] : Fully functional and needs no help or supervision to perform IADLs (using the telephone, shopping, preparing meals, housekeeping, doing laundry, using transportation, managing medications and managing finances) [de-identified] : Cardiology [Audit-CScore] : 1 [QUT8Cpmji] : 0 [Sexually Active] : not sexually active [High Risk Behavior] : no high risk behavior [de-identified] : with friends [FreeTextEntry2] : rema

## 2022-10-21 NOTE — PLAN
[FreeTextEntry1] : Pt is a 52 M Tamaz[place] (S[place]) speaking 52M who was admitted to [facility] 10/6 for AWSTEMI in the setting of CP x 2 days from [facility], s/p LE x 1 to pLAD 99% D1 60% D2 70%, TTE EF 51% with no WMAs, LV thrombus or effusion. Presenting for NPA/CPE.\par \par #AWSTEMI\par -C/w ASA 81, Plavix 75 mg. Explained importance of taking DAPT to prevent deadly stent rupture\par -C/w Losartan 25 mg QD\par -C/w Toprol 50 mg QD\par -C/w Atorvastatin 80 mg\par -Encourage Mediterranean diet and lifestyle modifications (physical activity as tolerated, no smoking, etc), josé miguel given patient's strong FHx of CAD/stroke\par \par #HCM\par -CBC, CMP, lipid panel, A1C, HIV, HCV\par -Flu shot, Prevnar given\par -Received COVID x3, encouraged bivalent booster\par -FOBT given for colon cancer screening\par -Given lack of insurance, referred to [facility] pharmacy program

## 2022-10-21 NOTE — HISTORY OF PRESENT ILLNESS
[FreeTextEntry1] : NPA/CPE [de-identified] : Pt is a 52 M Italian (S[place]) speaking 52M who was admitted to [facility] 10/6 for AWSTEMI in the setting of CP x 2 days from [facility], s/p LE x 1 to pLAD 99% D1 60% D2 70%, TTE EF 51% with no WMAs, LV thrombus or effusion. Presenting for NPA/CPE.\par \par Since being discharged from the hospital, patient feels well. Denies chest pain, palpitations, SOB, fatigue, lightheadedness, N/V, abdominal pain, constipation, diarrhea, hematochezia, hematuria, melena. No complaints today.

## 2022-10-25 DIAGNOSIS — I21.09 ST ELEVATION (STEMI) MYOCARDIAL INFARCTION INVOLVING OTHER CORONARY ARTERY OF ANTERIOR WALL: ICD-10-CM

## 2022-10-25 DIAGNOSIS — Z00.00 ENCOUNTER FOR GENERAL ADULT MEDICAL EXAMINATION WITHOUT ABNORMAL FINDINGS: ICD-10-CM

## 2022-10-25 DIAGNOSIS — Z23 ENCOUNTER FOR IMMUNIZATION: ICD-10-CM

## 2022-11-07 ENCOUNTER — NON-APPOINTMENT (OUTPATIENT)
Age: 52
End: 2022-11-07

## 2022-11-07 ENCOUNTER — APPOINTMENT (OUTPATIENT)
Dept: INTERNAL MEDICINE | Facility: CLINIC | Age: 52
End: 2022-11-07

## 2022-11-07 ENCOUNTER — OUTPATIENT (OUTPATIENT)
Dept: OUTPATIENT SERVICES | Facility: HOSPITAL | Age: 52
LOS: 1 days | End: 2022-11-07
Payer: SELF-PAY

## 2022-11-07 VITALS
SYSTOLIC BLOOD PRESSURE: 118 MMHG | WEIGHT: 228 LBS | BODY MASS INDEX: 33.77 KG/M2 | HEIGHT: 69 IN | OXYGEN SATURATION: 98 % | HEART RATE: 70 BPM | DIASTOLIC BLOOD PRESSURE: 78 MMHG

## 2022-11-07 DIAGNOSIS — I10 ESSENTIAL (PRIMARY) HYPERTENSION: ICD-10-CM

## 2022-11-07 PROCEDURE — G0463: CPT | Mod: 25

## 2022-11-07 PROCEDURE — 93005 ELECTROCARDIOGRAM TRACING: CPT

## 2022-11-07 PROCEDURE — ZZZZZ: CPT

## 2022-11-07 PROCEDURE — T1013: CPT

## 2022-11-07 RX ORDER — LOSARTAN POTASSIUM 25 MG/1
25 TABLET, FILM COATED ORAL
Qty: 90 | Refills: 3 | Status: DISCONTINUED | COMMUNITY
Start: 2022-10-12 | End: 2022-11-07

## 2022-11-07 RX ORDER — ATORVASTATIN CALCIUM 80 MG/1
80 TABLET, FILM COATED ORAL
Qty: 90 | Refills: 3 | Status: DISCONTINUED | COMMUNITY
Start: 2022-10-12 | End: 2022-11-07

## 2022-11-09 NOTE — ASSESSMENT
[FreeTextEntry1] : CAD w/ AWSTEMI Oct 6 s/p LE to LAD: followed by cardiology, on DAPT and high-intensity statin. \par - reviewed Plavix pricing, found coupon for [facility] and patient will need to pay out of pocket, stressed importance to continuing this medication to prevent life-threatening stent thrombosis\par - continue ASA 81mg qd (out of pocket)\par - continue atorvastatin 80mg qd (sent as 40mg 2tabs daily to [facility])\par - valsartan 40mg qd (losartan stopped due to [facility] coverage) \par \par Chest pain: considering muscle strain, costochondritis, pericarditis (as sequelae of recent MI). R/o ischemic etiology. \par - EKG done and reviewed: NSR w/ HR 60; no ischemic changes\par - avoid NSAIDs given AWSTEMI 10/6\par - can take Tylenol PRN for pain\par - advised to call if alarm signs arise; patient reports current chest pain is b/l, unlike pain from MI\par \par \par RTC PRN, CPE, medication refills. Advised to call if there are any issues obtaining medications from [facility]. \par \par Case discussed with Dr. Britt.

## 2022-11-09 NOTE — ASSESSMENT
[FreeTextEntry1] : CAD w/ AWSTEMI Oct 6 s/p LE to LAD: followed by cardiology, on DAPT and high-intensity statin. \par - reviewed Plavix pricing, found coupon for [facility] and patient will need to pay out of pocket, stressed importance to continuing this medication to prevent life-threatening stent thrombosis\par - continue ASA 81mg qd (out of pocket)\par - continue atorvastatin 80mg qd (sent as 40mg 2tabs daily to [facility])\par - valsartan 40mg qd (losartan stopped due to [facility] coverage) \par \par Chest pain: considering muscle strain, costochondritis, pericarditis (as sequelae of recent MI). R/o ischemic etiology. \par - EKG done and reviewed: NSR w/ HR 60; no ischemic changes\par - avoid NSAIDs given AWSTEMI 10/6\par - can take Tylenol PRN for pain\par - advised to call if alarm signs arise; patient reports current chest pain is b/l, unlike pain from MI\par \par \par RTC PRN, CPE, medication refills. Advised to call if there are any issues obtaining medications from [facility]. \par \par Case discussed with Dr. Britt.

## 2022-11-09 NOTE — INTERPRETER SERVICES
[[employer] ] : provided by [employer]   [Time Spent: ____ minutes] : Total time spent using  services: [unfilled] minutes. The patient's primary language is not English thus required  services. [Interpreters_IDNumber] : [id number] [Interpreters_FullName] : Flor (Marcial)

## 2022-11-09 NOTE — PHYSICAL EXAM
[No Acute Distress] : no acute distress [Well Nourished] : well nourished [Normal Sclera/Conjunctiva] : normal sclera/conjunctiva [PERRL] : pupils equal round and reactive to light [Normal Outer Ear/Nose] : the outer ears and nose were normal in appearance [No JVD] : no jugular venous distention [No Lymphadenopathy] : no lymphadenopathy [No Respiratory Distress] : no respiratory distress  [No Accessory Muscle Use] : no accessory muscle use [Clear to Auscultation] : lungs were clear to auscultation bilaterally [Normal Rate] : normal rate  [Regular Rhythm] : with a regular rhythm [Normal S1, S2] : normal S1 and S2 [No Edema] : there was no peripheral edema [Soft] : abdomen soft [Non Tender] : non-tender [No Joint Swelling] : no joint swelling [Grossly Normal Strength/Tone] : grossly normal strength/tone [No Focal Deficits] : no focal deficits [Normal Gait] : normal gait [de-identified] : full anterior-posterior, lateral range of motion w/o pain of torso; no pain on palpation of inferior ribs, sternum

## 2022-11-09 NOTE — END OF VISIT
[] : Resident [FreeTextEntry3] : EKG with sinus rhythm, normal intervals, no signs of ischemia. Compared to prior ECG from 10/12, anterior T waves now resolved, improved R wave progression

## 2022-11-09 NOTE — INTERPRETER SERVICES
[[employer] ] : provided by [employer]   [Time Spent: ____ minutes] : Total time spent using  services: [unfilled] minutes. The patient's primary language is not English thus required  services. [Interpreters_IDNumber] : [id number] [Interpreters_FullName] : Flor (Marcial)

## 2022-11-09 NOTE — HISTORY OF PRESENT ILLNESS
[de-identified] : Malay \par \par #Chest Pain\par - Chest pain x 2 days B/L \par - Describes the pain as feeling tight, especially when leaning forward \par - Earlier, pt was unable to lift any weight, but has gotten a little better \par - pain started after exercise, did several chest exercises\par - denies radiation, denies association w/ diaphoresis, cough, regurgitation of sour liquid, eating, difficulty breathing, exertion\par - Denies feeling a similar sensation in the past, taking medications specifically for the pain, breathing issues,\par - Family hx --> Darrel had a stroke in the past and passed away/ Brother had tubing placed in the chest

## 2022-11-09 NOTE — REVIEW OF SYSTEMS
[Chest Pain] : chest pain [Negative] : ENT [Palpitations] : no palpitations [Lower Ext Edema] : no lower extremity edema [Orthopena] : no orthopnea [Shortness Of Breath] : no shortness of breath [Wheezing] : no wheezing [Cough] : no cough [Dyspnea on Exertion] : not dyspnea on exertion [Abdominal Pain] : no abdominal pain [Nausea] : no nausea [Constipation] : no constipation [Diarrhea] : no diarrhea [Vomiting] : no vomiting [Heartburn] : no heartburn [Joint Pain] : no joint pain [Back Pain] : no back pain

## 2022-11-09 NOTE — PHYSICAL EXAM
[No Acute Distress] : no acute distress [Well Nourished] : well nourished [Normal Sclera/Conjunctiva] : normal sclera/conjunctiva [PERRL] : pupils equal round and reactive to light [Normal Outer Ear/Nose] : the outer ears and nose were normal in appearance [No JVD] : no jugular venous distention [No Lymphadenopathy] : no lymphadenopathy [No Respiratory Distress] : no respiratory distress  [No Accessory Muscle Use] : no accessory muscle use [Clear to Auscultation] : lungs were clear to auscultation bilaterally [Normal Rate] : normal rate  [Regular Rhythm] : with a regular rhythm [Normal S1, S2] : normal S1 and S2 [No Edema] : there was no peripheral edema [Soft] : abdomen soft [Non Tender] : non-tender [No Joint Swelling] : no joint swelling [Grossly Normal Strength/Tone] : grossly normal strength/tone [No Focal Deficits] : no focal deficits [Normal Gait] : normal gait [de-identified] : full anterior-posterior, lateral range of motion w/o pain of torso; no pain on palpation of inferior ribs, sternum

## 2022-11-09 NOTE — HISTORY OF PRESENT ILLNESS
[de-identified] : Thai \par \par #Chest Pain\par - Chest pain x 2 days B/L \par - Describes the pain as feeling tight, especially when leaning forward \par - Earlier, pt was unable to lift any weight, but has gotten a little better \par - pain started after exercise, did several chest exercises\par - denies radiation, denies association w/ diaphoresis, cough, regurgitation of sour liquid, eating, difficulty breathing, exertion\par - Denies feeling a similar sensation in the past, taking medications specifically for the pain, breathing issues,\par - Family hx --> Darrel had a stroke in the past and passed away/ Brother had tubing placed in the chest

## 2022-11-10 DIAGNOSIS — R07.9 CHEST PAIN, UNSPECIFIED: ICD-10-CM

## 2022-11-10 DIAGNOSIS — I21.9 ACUTE MYOCARDIAL INFARCTION, UNSPECIFIED: ICD-10-CM

## 2023-01-18 ENCOUNTER — APPOINTMENT (OUTPATIENT)
Dept: CARDIOLOGY | Facility: HOSPITAL | Age: 53
End: 2023-01-18

## 2023-03-12 ENCOUNTER — RESULT CHARGE (OUTPATIENT)
Age: 53
End: 2023-03-12

## 2023-03-13 ENCOUNTER — APPOINTMENT (OUTPATIENT)
Dept: INTERNAL MEDICINE | Facility: CLINIC | Age: 53
End: 2023-03-13
Payer: COMMERCIAL

## 2023-03-13 ENCOUNTER — NON-APPOINTMENT (OUTPATIENT)
Age: 53
End: 2023-03-13

## 2023-03-13 ENCOUNTER — OUTPATIENT (OUTPATIENT)
Dept: OUTPATIENT SERVICES | Facility: HOSPITAL | Age: 53
LOS: 1 days | End: 2023-03-13
Payer: SELF-PAY

## 2023-03-13 VITALS — SYSTOLIC BLOOD PRESSURE: 122 MMHG | DIASTOLIC BLOOD PRESSURE: 80 MMHG | HEART RATE: 60 BPM | OXYGEN SATURATION: 99 %

## 2023-03-13 DIAGNOSIS — I10 ESSENTIAL (PRIMARY) HYPERTENSION: ICD-10-CM

## 2023-03-13 PROCEDURE — 85025 COMPLETE CBC W/AUTO DIFF WBC: CPT

## 2023-03-13 PROCEDURE — 86140 C-REACTIVE PROTEIN: CPT

## 2023-03-13 PROCEDURE — ZZZZZ: CPT | Mod: GC

## 2023-03-13 PROCEDURE — G0463: CPT

## 2023-03-13 PROCEDURE — T1013: CPT

## 2023-03-13 PROCEDURE — 85652 RBC SED RATE AUTOMATED: CPT

## 2023-03-13 PROCEDURE — 80053 COMPREHEN METABOLIC PANEL: CPT

## 2023-03-13 PROCEDURE — 83880 ASSAY OF NATRIURETIC PEPTIDE: CPT

## 2023-03-14 ENCOUNTER — APPOINTMENT (OUTPATIENT)
Dept: CARDIOLOGY | Facility: HOSPITAL | Age: 53
End: 2023-03-14

## 2023-03-14 ENCOUNTER — NON-APPOINTMENT (OUTPATIENT)
Age: 53
End: 2023-03-14

## 2023-03-14 NOTE — PHYSICAL EXAM
[No Acute Distress] : no acute distress [Well Nourished] : well nourished [Well Developed] : well developed [Well-Appearing] : well-appearing [Normal Sclera/Conjunctiva] : normal sclera/conjunctiva [PERRL] : pupils equal round and reactive to light [EOMI] : extraocular movements intact [Normal Outer Ear/Nose] : the outer ears and nose were normal in appearance [Normal Oropharynx] : the oropharynx was normal [No JVD] : no jugular venous distention [No Lymphadenopathy] : no lymphadenopathy [Supple] : supple [Thyroid Normal, No Nodules] : the thyroid was normal and there were no nodules present [No Respiratory Distress] : no respiratory distress  [No Accessory Muscle Use] : no accessory muscle use [Clear to Auscultation] : lungs were clear to auscultation bilaterally [Normal Rate] : normal rate  [Regular Rhythm] : with a regular rhythm [Normal S1, S2] : normal S1 and S2 [No Murmur] : no murmur heard [No Carotid Bruits] : no carotid bruits [No Abdominal Bruit] : a ~M bruit was not heard ~T in the abdomen [No Palpable Aorta] : no palpable aorta [No Extremity Clubbing/Cyanosis] : no extremity clubbing/cyanosis [Soft] : abdomen soft [Non Tender] : non-tender [Normal Bowel Sounds] : normal bowel sounds [Normal Posterior Cervical Nodes] : no posterior cervical lymphadenopathy [Normal Anterior Cervical Nodes] : no anterior cervical lymphadenopathy [No CVA Tenderness] : no CVA  tenderness [No Spinal Tenderness] : no spinal tenderness [No Joint Swelling] : no joint swelling [Grossly Normal Strength/Tone] : grossly normal strength/tone [No Rash] : no rash [Coordination Grossly Intact] : coordination grossly intact [No Focal Deficits] : no focal deficits [Normal Affect] : the affect was normal [Normal Insight/Judgement] : insight and judgment were intact [de-identified] : 1-2+ LE edema

## 2023-03-14 NOTE — END OF VISIT
[] : Resident [FreeTextEntry3] : Well-appearing, no obvious concerning signs of heart failure, mild 1+ LE edema, ?pericarditis or Dressler syndrome, advised any worsening at all to go to ER, otherwise to see cardiology tomorrow for further eval.

## 2023-03-14 NOTE — HISTORY OF PRESENT ILLNESS
[FreeTextEntry8] : 52 yo M Yoruba ([place]) speaking, with hx of AWSTEMI in the setting of CP x 2 days from [facility], s/p LE x 1 to pLAD 99% D1 60% D2 70%, TTE EF 51% with no WMAs, LV thrombus or effusion in October, 2022. Presenting as walk-in to clinic for acute visit for 4 day history of substernal chest pain. Patient reports pain is substernal in location, only present when sitting up from lying position and bending over. Rest or exertion do not change the pain, only position. It is associated with bilateral lower extremity swelling up to midshins. He denies orthopnea, shortness of breath, or dyspnea on exertion. He denies sick contacts or trauma to the chest, he reports this pain is different from his MI. He is compliant with medications, has not seen a Cardiologist since initial hospital discharge appointment in 10/2022.

## 2023-03-14 NOTE — ASSESSMENT
[FreeTextEntry1] : 52yo M with hx of AWSTEMI in 10/2022 presenting to clinic with noncardiogenic chest pain x4 days.\par \par //Chest pain\par Unclear etiology, recent MI. Low ACS suspicion at this time based on symptomatology and EKG. Cannot rule out pericardial effusion. Pain worsens with sitting up and bending forward, classically the opposite of pericarditis. DDx includes pericardial effusion vs pericarditis, ?Dressler, vs costochondritis.\par -Given patient's PMHx, Urgent cards referral requested (called clinic, appointment scheduled for tomorrow, 3/14).\par -Check TTE\par -Labs including inflammatory markers and ProBNP\par -ED precautions discussed at length\par -Continue home medication regimen at this time, DAPT, statin, BB, ARB\par \par \par Patient seen and d/w attending, Dr. Mike. RTC in 5 weeks with [facility] for f/u.

## 2023-03-14 NOTE — INTERPRETER SERVICES
[[employer] ] : provided by [employer]   [Time Spent: ____ minutes] : Total time spent using  services: [unfilled] minutes. The patient's primary language is not English thus required  services. [Interpreters_IDNumber] : [id number] [Interpreters_FullName] : Flor [FreeTextEntry3] : Arabic speaking [TWNoteComboBox1] : Other

## 2023-03-15 DIAGNOSIS — R07.9 CHEST PAIN, UNSPECIFIED: ICD-10-CM

## 2023-03-17 ENCOUNTER — NON-APPOINTMENT (OUTPATIENT)
Age: 53
End: 2023-03-17

## 2023-03-17 LAB
ALBUMIN SERPL ELPH-MCNC: 4.4 G/DL
ALP BLD-CCNC: 73 U/L
ALT SERPL-CCNC: 39 U/L
ANION GAP SERPL CALC-SCNC: 12 MMOL/L
AST SERPL-CCNC: 27 U/L
BASOPHILS # BLD AUTO: 0.05 K/UL
BASOPHILS NFR BLD AUTO: 0.7 %
BILIRUB SERPL-MCNC: 0.7 MG/DL
BUN SERPL-MCNC: 11 MG/DL
CALCIUM SERPL-MCNC: 9.5 MG/DL
CHLORIDE SERPL-SCNC: 107 MMOL/L
CO2 SERPL-SCNC: 22 MMOL/L
CREAT SERPL-MCNC: 0.93 MG/DL
CRP SERPL-MCNC: <3 MG/L
EGFR: 98 ML/MIN/1.73M2
EOSINOPHIL # BLD AUTO: 0.25 K/UL
EOSINOPHIL NFR BLD AUTO: 3.7 %
ERYTHROCYTE [SEDIMENTATION RATE] IN BLOOD BY WESTERGREN METHOD: 21 MM/HR
GLUCOSE SERPL-MCNC: 117 MG/DL
HCT VFR BLD CALC: 44.4 %
HGB BLD-MCNC: 14.9 G/DL
IMM GRANULOCYTES NFR BLD AUTO: 0.3 %
LYMPHOCYTES # BLD AUTO: 1.39 K/UL
LYMPHOCYTES NFR BLD AUTO: 20.4 %
MAN DIFF?: NORMAL
MCHC RBC-ENTMCNC: 31.2 PG
MCHC RBC-ENTMCNC: 33.6 GM/DL
MCV RBC AUTO: 92.9 FL
MONOCYTES # BLD AUTO: 0.8 K/UL
MONOCYTES NFR BLD AUTO: 11.8 %
NEUTROPHILS # BLD AUTO: 4.29 K/UL
NEUTROPHILS NFR BLD AUTO: 63.1 %
NT-PROBNP SERPL-MCNC: <5 PG/ML
PLATELET # BLD AUTO: 214 K/UL
POTASSIUM SERPL-SCNC: 4.9 MMOL/L
PROT SERPL-MCNC: 6.8 G/DL
RBC # BLD: 4.78 M/UL
RBC # FLD: 12 %
SODIUM SERPL-SCNC: 141 MMOL/L
WBC # FLD AUTO: 6.8 K/UL

## 2023-04-14 ENCOUNTER — OUTPATIENT (OUTPATIENT)
Dept: OUTPATIENT SERVICES | Facility: HOSPITAL | Age: 53
LOS: 1 days | End: 2023-04-14
Payer: SELF-PAY

## 2023-04-14 ENCOUNTER — APPOINTMENT (OUTPATIENT)
Dept: INTERNAL MEDICINE | Facility: CLINIC | Age: 53
End: 2023-04-14
Payer: COMMERCIAL

## 2023-04-14 VITALS
OXYGEN SATURATION: 98 % | SYSTOLIC BLOOD PRESSURE: 118 MMHG | DIASTOLIC BLOOD PRESSURE: 80 MMHG | HEIGHT: 71 IN | BODY MASS INDEX: 34.3 KG/M2 | HEART RATE: 83 BPM | WEIGHT: 245 LBS

## 2023-04-14 DIAGNOSIS — R60.0 LOCALIZED EDEMA: ICD-10-CM

## 2023-04-14 DIAGNOSIS — I10 ESSENTIAL (PRIMARY) HYPERTENSION: ICD-10-CM

## 2023-04-14 PROCEDURE — ZZZZZ: CPT | Mod: GC

## 2023-04-14 PROCEDURE — G0463: CPT

## 2023-04-15 NOTE — HISTORY OF PRESENT ILLNESS
[FreeTextEntry1] : CPE [de-identified] : 52 M Marcial ([place]) speaking 52M who was admitted to [facility] 10/6 for AWSTEMI in the setting of CP x 2 days from [facility], s/p LE x 1 to pLAD 99% D1 60% D2 70%, TTE EF 51% with no WMAs, LV thrombus or effusion. Presenting for CPE.\par \par Presented to clinic last month for atypical chest pain. Patient did not go to cardiologist appointment, thought appointment was on Saturday but was scheduled on another day. Also did not make appointment for echocardiogram. States he has the same chest pain from last visit intermittently, not currently having it. Described as midsternal, only present when sitting up from lying position and bending over. Relieved with change in position. States pain is not the same as when he had MI.\par \par Also now endorsing bilateral LE swelling for >1 month. Denies orthopnea, bendopnea, PND (sleeps with one pillow). No SOB. Usually sedentary but able to walk 5 blocks before SOB/fatigued. Has not noted any change in that. Denies KENNEDY.\par \par Patient also states he is compliant with his medications but does not know the names of them. States he takes 5 medications. States he has 2 medications at home, awaiting delivery of other 3. States he has Plavix and knows to take it everyday for stent. Lives with cousin Ion. Patient manages medications on his own. \par \par \par \par

## 2023-04-15 NOTE — END OF VISIT
[] : Resident [FreeTextEntry3] : Majority of visit spent counselling pt on importance of medications, explanation of medical issues. Need to review preventative care measures next visit. Pt in need of close follow up; health literacy concerns.

## 2023-04-15 NOTE — PHYSICAL EXAM
[No Carotid Bruits] : no carotid bruits [No Varicosities] : no varicosities [Pedal Pulses Present] : the pedal pulses are present [Normal] : affect was normal and insight and judgment were intact [de-identified] : 1-2+ pitting edema to mid-shins

## 2023-04-15 NOTE — ASSESSMENT
[FreeTextEntry1] : 54yo M with hx of AWSTEMI in 10/2022 presenting to clinic for CPE.\par \par #LE Swelling\par - DDx CHF exacerbation\par - Clear lungs and no JVD\par - TTE ordered\par \par #Chest pain\par -No active chest pain, palpitations at this time.\par -EKG from previous appointment w/o ischemic changes\par -Unclear etiology, recent MI. DDx includes pericardial effusion, pericarditis but negative inflammatory markers. \par -Patient referred again to cardiology. Stressed again importance of making this appointment. \par -Given instructions for going to ED if worsening/sustained chest pain, SOB, etc\par -Continue home medication regimen at this time, DAPT, statin, BB, ARB. Stressed medical adherence given recent stent placement. Given med list again, called patient at home to go thru each medication to confirm he has them. \par -Discussed with patient's cousin Ion ([phone]) the need for him to be secondary assist with medication administration. Also stressed to patient to come back to [phone] with documents for sliding scale in order to schedule further cardiology and echocardiogram appointments. Unclear if patient or family grasping urgency of situation. \par \par #HCM\par -CBC, CMP, lipid panel, A1C pending\par -Due for FOBT/colonoscopy once patient's immediate medical issues addressed\par \par RTC 1-2 weeks to ensure follow-up \par

## 2023-04-15 NOTE — INTERPRETER SERVICES
[[employer] ] : provided by [employer]   [Interpreters_IDNumber] : [id number] [FreeTextEntry3] : Matthewese [TWNoteComboBox1] : Other

## 2023-04-17 DIAGNOSIS — Z00.00 ENCOUNTER FOR GENERAL ADULT MEDICAL EXAMINATION WITHOUT ABNORMAL FINDINGS: ICD-10-CM

## 2023-04-17 DIAGNOSIS — R07.9 CHEST PAIN, UNSPECIFIED: ICD-10-CM

## 2023-04-17 DIAGNOSIS — R73.03 PREDIABETES: ICD-10-CM

## 2023-04-17 DIAGNOSIS — R60.0 LOCALIZED EDEMA: ICD-10-CM

## 2023-04-17 DIAGNOSIS — I21.09 ST ELEVATION (STEMI) MYOCARDIAL INFARCTION INVOLVING OTHER CORONARY ARTERY OF ANTERIOR WALL: ICD-10-CM

## 2023-05-17 ENCOUNTER — APPOINTMENT (OUTPATIENT)
Dept: INTERNAL MEDICINE | Facility: CLINIC | Age: 53
End: 2023-05-17

## 2023-05-17 NOTE — HISTORY OF PRESENT ILLNESS
[FreeTextEntry2] : 52 M [place]cial [facility] speaking 52M who was admitted to [facility] 10/6 for AWSTEMI in the setting of CP x 2 days from [facility], s/p LE x 1 to pLAD 99% D1 60% D2 70%, TTE EF 51% with no WMAs, LV thrombus or effusion.

## 2023-06-07 ENCOUNTER — APPOINTMENT (OUTPATIENT)
Dept: INTERNAL MEDICINE | Facility: CLINIC | Age: 53
End: 2023-06-07

## 2023-07-05 ENCOUNTER — APPOINTMENT (OUTPATIENT)
Dept: INTERNAL MEDICINE | Facility: CLINIC | Age: 53
End: 2023-07-05

## 2023-07-19 ENCOUNTER — APPOINTMENT (OUTPATIENT)
Dept: CARDIOLOGY | Facility: HOSPITAL | Age: 53
End: 2023-07-19

## 2023-07-19 ENCOUNTER — OUTPATIENT (OUTPATIENT)
Dept: OUTPATIENT SERVICES | Facility: HOSPITAL | Age: 53
LOS: 1 days | End: 2023-07-19
Payer: SELF-PAY

## 2023-07-19 ENCOUNTER — NON-APPOINTMENT (OUTPATIENT)
Age: 53
End: 2023-07-19

## 2023-07-19 VITALS
SYSTOLIC BLOOD PRESSURE: 126 MMHG | WEIGHT: 245 LBS | BODY MASS INDEX: 35.07 KG/M2 | HEIGHT: 70 IN | OXYGEN SATURATION: 99 % | DIASTOLIC BLOOD PRESSURE: 77 MMHG | HEART RATE: 75 BPM

## 2023-07-19 DIAGNOSIS — I25.10 ATHEROSCLEROTIC HEART DISEASE OF NATIVE CORONARY ARTERY WITHOUT ANGINA PECTORIS: ICD-10-CM

## 2023-07-19 PROCEDURE — 93005 ELECTROCARDIOGRAM TRACING: CPT

## 2023-07-19 PROCEDURE — 80061 LIPID PANEL: CPT

## 2023-07-19 PROCEDURE — 83036 HEMOGLOBIN GLYCOSYLATED A1C: CPT

## 2023-07-19 PROCEDURE — 80053 COMPREHEN METABOLIC PANEL: CPT

## 2023-07-19 PROCEDURE — 85025 COMPLETE CBC W/AUTO DIFF WBC: CPT

## 2023-07-19 PROCEDURE — G0463: CPT

## 2023-07-19 NOTE — HISTORY OF PRESENT ILLNESS
[FreeTextEntry1] : 52 yo M with hx of AWSTEMI in 10/2022 s/p LHC found pLAD 99%, D1 60%, D2 70% s/p LE x1 to pLAD. S/p short CICU stay. TTE showed EF 51% with no WMA and mild MR. A1c was 5.8% and LDL was 146 on lipid panel. \par \par Patient has been following up in the medicine clinic but has had trouble with scheduling tests and getting blood work bc he says he gets confused with the instructions. He endorsed ankle swelling since about 3 months ago as well as 1 episode of atypical chest pain that happened in the setting of sitting up from lying position and bending over about 1 month ago. Not exertional. Otherwise, he is able to walk and do his daily activities without issues. Denies orthopnea, PND, palpitations, syncope, dizziness. \par \par EKG shows NSR

## 2023-07-19 NOTE — DISCUSSION/SUMMARY
[FreeTextEntry1] : 52 yo M with hx of AWSTEMI in 10/2022 s/p 1 LE to pLAD presented for follow up.\par \par #CAD\par - had some atypical chest pain as stated above, patient missed his echo ordered by PCP\par - re-ordered TTE, will schedule it for patient\par - ordered exercise nuclear stress test given atypical chest pain\par - c/w asa 81 daily and plv 75 daily, plan to stop plv in 10/2023\par - c/w atorvastatin 80 daily\par - c/w toprol 50 daily\par - c/w valsartan 40 daily\par - all refills sent to [facility]; patient used to use a pharmacy in [facility] but he could not remember the name or address\par - patient also missed repeat labs ordered by PCP, will show him his way to the lab today, lipid panel will be non-fasting\par \par \par RTC in 3 months

## 2023-07-19 NOTE — PHYSICAL EXAM
[Well Developed] : well developed [Well Nourished] : well nourished [No Acute Distress] : no acute distress [Normal Conjunctiva] : normal conjunctiva [Normal Venous Pressure] : normal venous pressure [No Carotid Bruit] : no carotid bruit [Normal S1, S2] : normal S1, S2 [No Murmur] : no murmur [No Rub] : no rub [No Gallop] : no gallop [Clear Lung Fields] : clear lung fields [Good Air Entry] : good air entry [No Respiratory Distress] : no respiratory distress  [Soft] : abdomen soft [Non Tender] : non-tender [No Masses/organomegaly] : no masses/organomegaly [Normal Bowel Sounds] : normal bowel sounds [Normal Gait] : normal gait [No Cyanosis] : no cyanosis [No Clubbing] : no clubbing [No Varicosities] : no varicosities [Edema ___] : edema [unfilled] [No Skin Lesions] : no skin lesions [No Rash] : no rash [Moves all extremities] : moves all extremities [No Focal Deficits] : no focal deficits [Normal Speech] : normal speech [Alert and Oriented] : alert and oriented [Normal memory] : normal memory

## 2023-07-21 LAB
ALBUMIN SERPL ELPH-MCNC: 4.7 G/DL
ALP BLD-CCNC: 91 U/L
ALT SERPL-CCNC: 35 U/L
ANION GAP SERPL CALC-SCNC: 13 MMOL/L
AST SERPL-CCNC: 24 U/L
BILIRUB SERPL-MCNC: 0.4 MG/DL
BUN SERPL-MCNC: 14 MG/DL
CALCIUM SERPL-MCNC: 9.5 MG/DL
CHLORIDE SERPL-SCNC: 101 MMOL/L
CHOLEST SERPL-MCNC: 174 MG/DL
CO2 SERPL-SCNC: 23 MMOL/L
CREAT SERPL-MCNC: 1.08 MG/DL
EGFR: 82 ML/MIN/1.73M2
ESTIMATED AVERAGE GLUCOSE: 131 MG/DL
GLUCOSE SERPL-MCNC: 118 MG/DL
HBA1C MFR BLD HPLC: 6.2 %
HDLC SERPL-MCNC: 30 MG/DL
LDLC SERPL CALC-MCNC: 54 MG/DL
NONHDLC SERPL-MCNC: 144 MG/DL
POTASSIUM SERPL-SCNC: 4.5 MMOL/L
PROT SERPL-MCNC: 7.3 G/DL
SODIUM SERPL-SCNC: 138 MMOL/L
TRIGL SERPL-MCNC: 614 MG/DL

## 2023-07-27 ENCOUNTER — APPOINTMENT (OUTPATIENT)
Dept: INTERNAL MEDICINE | Facility: CLINIC | Age: 53
End: 2023-07-27

## 2023-07-27 DIAGNOSIS — R73.02 IMPAIRED GLUCOSE TOLERANCE (ORAL): ICD-10-CM

## 2023-08-02 ENCOUNTER — OUTPATIENT (OUTPATIENT)
Dept: OUTPATIENT SERVICES | Facility: HOSPITAL | Age: 53
LOS: 1 days | End: 2023-08-02

## 2023-08-02 ENCOUNTER — APPOINTMENT (OUTPATIENT)
Dept: CV DIAGNOSTICS | Facility: HOSPITAL | Age: 53
End: 2023-08-02

## 2023-08-02 DIAGNOSIS — I25.10 ATHEROSCLEROTIC HEART DISEASE OF NATIVE CORONARY ARTERY WITHOUT ANGINA PECTORIS: ICD-10-CM

## 2023-08-17 ENCOUNTER — APPOINTMENT (OUTPATIENT)
Dept: INTERNAL MEDICINE | Facility: CLINIC | Age: 53
End: 2023-08-17
Payer: COMMERCIAL

## 2023-08-17 ENCOUNTER — OUTPATIENT (OUTPATIENT)
Dept: OUTPATIENT SERVICES | Facility: HOSPITAL | Age: 53
LOS: 1 days | End: 2023-08-17
Payer: SELF-PAY

## 2023-08-17 VITALS
SYSTOLIC BLOOD PRESSURE: 120 MMHG | HEIGHT: 70 IN | OXYGEN SATURATION: 98 % | HEART RATE: 79 BPM | DIASTOLIC BLOOD PRESSURE: 70 MMHG | WEIGHT: 250 LBS | BODY MASS INDEX: 35.79 KG/M2

## 2023-08-17 DIAGNOSIS — M25.562 PAIN IN LEFT KNEE: ICD-10-CM

## 2023-08-17 DIAGNOSIS — I10 ESSENTIAL (PRIMARY) HYPERTENSION: ICD-10-CM

## 2023-08-17 DIAGNOSIS — I25.10 ATHEROSCLEROTIC HEART DISEASE OF NATIVE CORONARY ARTERY WITHOUT ANGINA PECTORIS: ICD-10-CM

## 2023-08-17 PROCEDURE — 99214 OFFICE O/P EST MOD 30 MIN: CPT

## 2023-08-18 NOTE — INTERPRETER SERVICES
[[employer] ] : provided by [employer]   [Time Spent: ____ minutes] : Total time spent using  services: [unfilled] minutes. The patient's primary language is not English thus required  services. [Interpreters_IDNumber] : [id number] [Interpreters_FullName] : Sharif [FreeTextEntry3] : Pashto [TWNoteComboBox1] : Yung

## 2023-08-18 NOTE — PHYSICAL EXAM
[No Acute Distress] : no acute distress [Well-Appearing] : well-appearing [Normal] : normal rate, regular rhythm, normal S1 and S2 and no murmur heard [Soft] : abdomen soft [Non Tender] : non-tender [Non-distended] : non-distended [No Joint Swelling] : no joint swelling [Grossly Normal Strength/Tone] : grossly normal strength/tone [Normal Gait] : normal gait [Normal Affect] : the affect was normal [de-identified] : +LE edema b/l [de-identified] : L knee w/ palpable click upon extension, TTP inferior to patella

## 2023-08-18 NOTE — REVIEW OF SYSTEMS
[Chest Pain] : chest pain [Lower Ext Edema] : lower extremity edema [Joint Pain] : joint pain [Fever] : no fever [Fatigue] : no fatigue [Night Sweats] : no night sweats [Recent Change In Weight] : ~T no recent weight change [Palpitations] : no palpitations [Orthopena] : no orthopnea [Paroxysmal Nocturnal Dyspnea] : no paroxysmal nocturnal dyspnea [Shortness Of Breath] : no shortness of breath [Wheezing] : no wheezing [Cough] : no cough [Dyspnea on Exertion] : not dyspnea on exertion [Abdominal Pain] : no abdominal pain [Nausea] : no nausea [Heartburn] : no heartburn [FreeTextEntry9] : knee pain

## 2023-08-18 NOTE — HISTORY OF PRESENT ILLNESS
[FreeTextEntry1] : 53M presenting for HFU.  [de-identified] : 52 yo M with hx of AWSTEMI in 10/2022 s/p LHC found pLAD 99%, D1 60%, D2 70% s/p LE x1 to pLAD. S/p short CICU stay. TTE showed EF 51% with no WMA and mild MR. A1c was 5.8% and LDL was 146 on lipid panel. Recently had negative stress test. TGL 600s+, though lipid panel was not fasting.  Continues to endorse chest pain. Last experienced when running for the bus on the way to the appointment. CP does not occur with light exertion, or at rest; no association w/ SOB, arm/jaw pain, indigestion, diaphoresis. Later clarified chest pain is most pronounced in center of chest when moving from lying down to sitting, worsened w/ movement. Endorses consistent LE edema. Denies orthopnea, PND, cough; no limitations to exercise tolerance.  Reports taking all 5 medications as prescribed, med rec performed by pharmacist Palmer Moraes (see note). Notes he has 2 weeks of his medications left, pays $12 total for them.   Also c/o L knee pain inferior to patella. Exacerbated by going up stairs, no issue with walking. No falls, trauma, cannot recall specific injury mechanism. Has not been taking any medication.

## 2023-08-18 NOTE — ASSESSMENT
[FreeTextEntry1] : 53M w/ PMH AWSTEMI in Oct 2022 on DAPT, EF 51% w/o WMA, recurrent chest pain (neg stress test Aug 2023) presenting for HFU.  CP - likely MSK given reproducible w/ movement, worse when moving from lying down to sitting, no associated symptoms, neg stress test w/ 10 METS done early August 2023.  pre-DM: A1c 6.2 - start Trulicity 0.75mg q1week - counseled on GI side effects - completed injection teaching - sent to [facility]  Knee pain: w/ palpable click, no mechanism of injury, exacerbated by ascending stairs - low suspicion for fracture, suspect ligamentous/meniscal injury.  - advised Tylenol PRN - heat/ice as needed - if no improvement over next few weeks, will consider imaging  RTC 5 weeks to assess tolerance of Trulicity, uptitrate if possible; also re-eval knee pain.   Case d/w Dr. Lacy.

## 2023-08-18 NOTE — END OF VISIT
[] : Resident [FreeTextEntry3] : hospital fu, doing well, discussed cardio fu and med mgmt start trulicity. Discussed risk and benefits of medications and pt wishes to proceed. rpt lipids

## 2023-08-29 ENCOUNTER — LABORATORY RESULT (OUTPATIENT)
Age: 53
End: 2023-08-29

## 2023-08-29 LAB
CHOLEST SERPL-MCNC: 127 MG/DL — SIGNIFICANT CHANGE UP
HDLC SERPL-MCNC: 29 MG/DL — LOW
LIPID PNL WITH DIRECT LDL SERPL: 71 MG/DL — SIGNIFICANT CHANGE UP
NON HDL CHOLESTEROL: 99 MG/DL — SIGNIFICANT CHANGE UP
TRIGL SERPL-MCNC: 159 MG/DL — HIGH

## 2023-08-29 PROCEDURE — 80061 LIPID PANEL: CPT

## 2023-08-29 PROCEDURE — 36415 COLL VENOUS BLD VENIPUNCTURE: CPT

## 2023-09-01 ENCOUNTER — NON-APPOINTMENT (OUTPATIENT)
Age: 53
End: 2023-09-01

## 2023-09-21 ENCOUNTER — APPOINTMENT (OUTPATIENT)
Dept: INTERNAL MEDICINE | Facility: CLINIC | Age: 53
End: 2023-09-21

## 2023-10-03 ENCOUNTER — NON-APPOINTMENT (OUTPATIENT)
Age: 53
End: 2023-10-03

## 2023-10-23 ENCOUNTER — OUTPATIENT (OUTPATIENT)
Dept: OUTPATIENT SERVICES | Facility: HOSPITAL | Age: 53
LOS: 1 days | End: 2023-10-23
Payer: SELF-PAY

## 2023-10-23 ENCOUNTER — APPOINTMENT (OUTPATIENT)
Dept: INTERNAL MEDICINE | Facility: CLINIC | Age: 53
End: 2023-10-23
Payer: COMMERCIAL

## 2023-10-23 VITALS
HEIGHT: 70 IN | DIASTOLIC BLOOD PRESSURE: 70 MMHG | SYSTOLIC BLOOD PRESSURE: 100 MMHG | OXYGEN SATURATION: 96 % | HEART RATE: 75 BPM | BODY MASS INDEX: 35.79 KG/M2 | WEIGHT: 250 LBS

## 2023-10-23 DIAGNOSIS — Z00.00 ENCOUNTER FOR GENERAL ADULT MEDICAL EXAMINATION W/OUT ABNORMAL FINDINGS: ICD-10-CM

## 2023-10-23 DIAGNOSIS — R07.9 CHEST PAIN, UNSPECIFIED: ICD-10-CM

## 2023-10-23 DIAGNOSIS — I10 ESSENTIAL (PRIMARY) HYPERTENSION: ICD-10-CM

## 2023-10-23 PROCEDURE — G0463: CPT | Mod: 25

## 2023-10-23 PROCEDURE — T1013: CPT

## 2023-10-23 PROCEDURE — 99213 OFFICE O/P EST LOW 20 MIN: CPT | Mod: 25,GC

## 2023-10-23 PROCEDURE — G0008: CPT

## 2023-10-23 RX ORDER — DULAGLUTIDE 1.5 MG/.5ML
1.5 INJECTION, SOLUTION SUBCUTANEOUS
Qty: 4 | Refills: 3 | Status: ACTIVE | COMMUNITY
Start: 2023-08-17 | End: 1900-01-01

## 2023-10-25 ENCOUNTER — OUTPATIENT (OUTPATIENT)
Dept: OUTPATIENT SERVICES | Facility: HOSPITAL | Age: 53
LOS: 1 days | End: 2023-10-25
Payer: SELF-PAY

## 2023-10-25 ENCOUNTER — APPOINTMENT (OUTPATIENT)
Dept: CARDIOLOGY | Facility: HOSPITAL | Age: 53
End: 2023-10-25

## 2023-10-25 ENCOUNTER — NON-APPOINTMENT (OUTPATIENT)
Age: 53
End: 2023-10-25

## 2023-10-25 VITALS — SYSTOLIC BLOOD PRESSURE: 116 MMHG | HEART RATE: 84 BPM | OXYGEN SATURATION: 94 % | DIASTOLIC BLOOD PRESSURE: 75 MMHG

## 2023-10-25 DIAGNOSIS — I25.10 ATHEROSCLEROTIC HEART DISEASE OF NATIVE CORONARY ARTERY WITHOUT ANGINA PECTORIS: ICD-10-CM

## 2023-10-25 PROCEDURE — G0463: CPT

## 2023-10-25 PROCEDURE — 93005 ELECTROCARDIOGRAM TRACING: CPT

## 2023-10-27 DIAGNOSIS — R73.03 PREDIABETES: ICD-10-CM

## 2023-10-27 DIAGNOSIS — R07.9 CHEST PAIN, UNSPECIFIED: ICD-10-CM

## 2023-10-27 DIAGNOSIS — Z23 ENCOUNTER FOR IMMUNIZATION: ICD-10-CM

## 2023-10-27 DIAGNOSIS — Z00.00 ENCOUNTER FOR GENERAL ADULT MEDICAL EXAMINATION WITHOUT ABNORMAL FINDINGS: ICD-10-CM

## 2023-10-31 ENCOUNTER — APPOINTMENT (OUTPATIENT)
Dept: CV DIAGNOSITCS | Facility: HOSPITAL | Age: 53
End: 2023-10-31

## 2023-10-31 ENCOUNTER — OUTPATIENT (OUTPATIENT)
Dept: OUTPATIENT SERVICES | Facility: HOSPITAL | Age: 53
LOS: 1 days | End: 2023-10-31
Payer: SELF-PAY

## 2023-10-31 DIAGNOSIS — I25.10 ATHEROSCLEROTIC HEART DISEASE OF NATIVE CORONARY ARTERY WITHOUT ANGINA PECTORIS: ICD-10-CM

## 2023-10-31 PROCEDURE — 76376 3D RENDER W/INTRP POSTPROCES: CPT | Mod: 26

## 2023-10-31 PROCEDURE — 93356 MYOCRD STRAIN IMG SPCKL TRCK: CPT

## 2023-10-31 PROCEDURE — 76376 3D RENDER W/INTRP POSTPROCES: CPT

## 2023-10-31 PROCEDURE — 93306 TTE W/DOPPLER COMPLETE: CPT

## 2023-10-31 PROCEDURE — 93306 TTE W/DOPPLER COMPLETE: CPT | Mod: 26

## 2023-11-10 NOTE — DISCHARGE NOTE NURSING/CASE MANAGEMENT/SOCIAL WORK - PATIENT PORTAL LINK FT
You can access the FollowMyHealth Patient Portal offered by [facility] by registering at the following website: [email or web address]. By joining [employer]’s FollowMyHealth portal, you will also be able to view your health information using other applications (apps) compatible with our system. : Yes

## 2023-11-16 ENCOUNTER — INPATIENT (INPATIENT)
Facility: HOSPITAL | Age: 53
LOS: 0 days | Discharge: HOME | End: 2023-11-17
Attending: STUDENT IN AN ORGANIZED HEALTH CARE EDUCATION/TRAINING PROGRAM | Admitting: STUDENT IN AN ORGANIZED HEALTH CARE EDUCATION/TRAINING PROGRAM
Payer: MEDICAID

## 2023-11-16 VITALS
DIASTOLIC BLOOD PRESSURE: 71 MMHG | SYSTOLIC BLOOD PRESSURE: 106 MMHG | HEART RATE: 72 BPM | TEMPERATURE: 98 F | RESPIRATION RATE: 16 BRPM | OXYGEN SATURATION: 99 %

## 2023-11-16 DIAGNOSIS — Z98.890 OTHER SPECIFIED POSTPROCEDURAL STATES: Chronic | ICD-10-CM

## 2023-11-16 DIAGNOSIS — R07.89 OTHER CHEST PAIN: ICD-10-CM

## 2023-11-16 LAB
ANION GAP SERPL CALC-SCNC: 11 MMOL/L — SIGNIFICANT CHANGE UP (ref 7–14)
ANION GAP SERPL CALC-SCNC: 11 MMOL/L — SIGNIFICANT CHANGE UP (ref 7–14)
BUN SERPL-MCNC: 14 MG/DL — SIGNIFICANT CHANGE UP (ref 7–23)
BUN SERPL-MCNC: 14 MG/DL — SIGNIFICANT CHANGE UP (ref 7–23)
CALCIUM SERPL-MCNC: 9.2 MG/DL — SIGNIFICANT CHANGE UP (ref 8.4–10.5)
CALCIUM SERPL-MCNC: 9.2 MG/DL — SIGNIFICANT CHANGE UP (ref 8.4–10.5)
CHLORIDE SERPL-SCNC: 106 MMOL/L — SIGNIFICANT CHANGE UP (ref 98–107)
CHLORIDE SERPL-SCNC: 106 MMOL/L — SIGNIFICANT CHANGE UP (ref 98–107)
CO2 SERPL-SCNC: 22 MMOL/L — SIGNIFICANT CHANGE UP (ref 22–31)
CO2 SERPL-SCNC: 22 MMOL/L — SIGNIFICANT CHANGE UP (ref 22–31)
CREAT SERPL-MCNC: 0.87 MG/DL — SIGNIFICANT CHANGE UP (ref 0.5–1.3)
CREAT SERPL-MCNC: 0.87 MG/DL — SIGNIFICANT CHANGE UP (ref 0.5–1.3)
EGFR: 103 ML/MIN/1.73M2 — SIGNIFICANT CHANGE UP
EGFR: 103 ML/MIN/1.73M2 — SIGNIFICANT CHANGE UP
GLUCOSE SERPL-MCNC: 112 MG/DL — HIGH (ref 70–99)
GLUCOSE SERPL-MCNC: 112 MG/DL — HIGH (ref 70–99)
HCT VFR BLD CALC: 41.9 % — SIGNIFICANT CHANGE UP (ref 39–50)
HCT VFR BLD CALC: 41.9 % — SIGNIFICANT CHANGE UP (ref 39–50)
HGB BLD-MCNC: 14.4 G/DL — SIGNIFICANT CHANGE UP (ref 13–17)
HGB BLD-MCNC: 14.4 G/DL — SIGNIFICANT CHANGE UP (ref 13–17)
MCHC RBC-ENTMCNC: 30.3 PG — SIGNIFICANT CHANGE UP (ref 27–34)
MCHC RBC-ENTMCNC: 30.3 PG — SIGNIFICANT CHANGE UP (ref 27–34)
MCHC RBC-ENTMCNC: 34.4 GM/DL — SIGNIFICANT CHANGE UP (ref 32–36)
MCHC RBC-ENTMCNC: 34.4 GM/DL — SIGNIFICANT CHANGE UP (ref 32–36)
MCV RBC AUTO: 88 FL — SIGNIFICANT CHANGE UP (ref 80–100)
MCV RBC AUTO: 88 FL — SIGNIFICANT CHANGE UP (ref 80–100)
NRBC # BLD: 0 /100 WBCS — SIGNIFICANT CHANGE UP (ref 0–0)
NRBC # BLD: 0 /100 WBCS — SIGNIFICANT CHANGE UP (ref 0–0)
NRBC # FLD: 0 K/UL — SIGNIFICANT CHANGE UP (ref 0–0)
NRBC # FLD: 0 K/UL — SIGNIFICANT CHANGE UP (ref 0–0)
PLATELET # BLD AUTO: 197 K/UL — SIGNIFICANT CHANGE UP (ref 150–400)
PLATELET # BLD AUTO: 197 K/UL — SIGNIFICANT CHANGE UP (ref 150–400)
POTASSIUM SERPL-MCNC: 4.5 MMOL/L — SIGNIFICANT CHANGE UP (ref 3.5–5.3)
POTASSIUM SERPL-MCNC: 4.5 MMOL/L — SIGNIFICANT CHANGE UP (ref 3.5–5.3)
POTASSIUM SERPL-SCNC: 4.5 MMOL/L — SIGNIFICANT CHANGE UP (ref 3.5–5.3)
POTASSIUM SERPL-SCNC: 4.5 MMOL/L — SIGNIFICANT CHANGE UP (ref 3.5–5.3)
RBC # BLD: 4.76 M/UL — SIGNIFICANT CHANGE UP (ref 4.2–5.8)
RBC # BLD: 4.76 M/UL — SIGNIFICANT CHANGE UP (ref 4.2–5.8)
RBC # FLD: 12.3 % — SIGNIFICANT CHANGE UP (ref 10.3–14.5)
RBC # FLD: 12.3 % — SIGNIFICANT CHANGE UP (ref 10.3–14.5)
SODIUM SERPL-SCNC: 139 MMOL/L — SIGNIFICANT CHANGE UP (ref 135–145)
SODIUM SERPL-SCNC: 139 MMOL/L — SIGNIFICANT CHANGE UP (ref 135–145)
WBC # BLD: 6.78 K/UL — SIGNIFICANT CHANGE UP (ref 3.8–10.5)
WBC # BLD: 6.78 K/UL — SIGNIFICANT CHANGE UP (ref 3.8–10.5)
WBC # FLD AUTO: 6.78 K/UL — SIGNIFICANT CHANGE UP (ref 3.8–10.5)
WBC # FLD AUTO: 6.78 K/UL — SIGNIFICANT CHANGE UP (ref 3.8–10.5)

## 2023-11-16 PROCEDURE — 93458 L HRT ARTERY/VENTRICLE ANGIO: CPT | Mod: 26

## 2023-11-16 PROCEDURE — 93010 ELECTROCARDIOGRAM REPORT: CPT

## 2023-11-16 PROCEDURE — 99152 MOD SED SAME PHYS/QHP 5/>YRS: CPT

## 2023-11-16 RX ORDER — ATORVASTATIN CALCIUM 80 MG/1
40 TABLET, FILM COATED ORAL AT BEDTIME
Refills: 0 | Status: DISCONTINUED | OUTPATIENT
Start: 2023-11-16 | End: 2023-11-17

## 2023-11-16 RX ORDER — VALSARTAN 80 MG/1
40 TABLET ORAL DAILY
Refills: 0 | Status: DISCONTINUED | OUTPATIENT
Start: 2023-11-16 | End: 2023-11-17

## 2023-11-16 RX ORDER — VALSARTAN 80 MG/1
1 TABLET ORAL
Refills: 0 | DISCHARGE

## 2023-11-16 RX ORDER — ATORVASTATIN CALCIUM 80 MG/1
1 TABLET, FILM COATED ORAL
Refills: 0 | DISCHARGE

## 2023-11-16 RX ORDER — CLOPIDOGREL BISULFATE 75 MG/1
75 TABLET, FILM COATED ORAL DAILY
Refills: 0 | Status: DISCONTINUED | OUTPATIENT
Start: 2023-11-17 | End: 2023-11-17

## 2023-11-16 RX ORDER — ASPIRIN/CALCIUM CARB/MAGNESIUM 324 MG
81 TABLET ORAL DAILY
Refills: 0 | Status: DISCONTINUED | OUTPATIENT
Start: 2023-11-17 | End: 2023-11-17

## 2023-11-16 RX ORDER — SODIUM CHLORIDE 9 MG/ML
3 INJECTION INTRAMUSCULAR; INTRAVENOUS; SUBCUTANEOUS EVERY 8 HOURS
Refills: 0 | Status: DISCONTINUED | OUTPATIENT
Start: 2023-11-16 | End: 2023-11-17

## 2023-11-16 RX ORDER — SODIUM CHLORIDE 9 MG/ML
250 INJECTION INTRAMUSCULAR; INTRAVENOUS; SUBCUTANEOUS ONCE
Refills: 0 | Status: COMPLETED | OUTPATIENT
Start: 2023-11-16 | End: 2023-11-16

## 2023-11-16 RX ORDER — SODIUM CHLORIDE 9 MG/ML
500 INJECTION INTRAMUSCULAR; INTRAVENOUS; SUBCUTANEOUS
Refills: 0 | Status: DISCONTINUED | OUTPATIENT
Start: 2023-11-16 | End: 2023-11-17

## 2023-11-16 RX ORDER — INFLUENZA VIRUS VACCINE 15; 15; 15; 15 UG/.5ML; UG/.5ML; UG/.5ML; UG/.5ML
0.5 SUSPENSION INTRAMUSCULAR ONCE
Refills: 0 | Status: DISCONTINUED | OUTPATIENT
Start: 2023-11-16 | End: 2023-11-17

## 2023-11-16 RX ORDER — METOPROLOL TARTRATE 50 MG
50 TABLET ORAL DAILY
Refills: 0 | Status: DISCONTINUED | OUTPATIENT
Start: 2023-11-16 | End: 2023-11-17

## 2023-11-16 RX ADMIN — ATORVASTATIN CALCIUM 40 MILLIGRAM(S): 80 TABLET, FILM COATED ORAL at 21:56

## 2023-11-16 RX ADMIN — SODIUM CHLORIDE 1000 MILLILITER(S): 9 INJECTION INTRAMUSCULAR; INTRAVENOUS; SUBCUTANEOUS at 10:35

## 2023-11-16 RX ADMIN — SODIUM CHLORIDE 75 MILLILITER(S): 9 INJECTION INTRAMUSCULAR; INTRAVENOUS; SUBCUTANEOUS at 10:42

## 2023-11-16 RX ADMIN — SODIUM CHLORIDE 3 MILLILITER(S): 9 INJECTION INTRAMUSCULAR; INTRAVENOUS; SUBCUTANEOUS at 17:04

## 2023-11-16 RX ADMIN — SODIUM CHLORIDE 3 MILLILITER(S): 9 INJECTION INTRAMUSCULAR; INTRAVENOUS; SUBCUTANEOUS at 21:57

## 2023-11-16 NOTE — H&P CARDIOLOGY - HISTORY OF PRESENT ILLNESS
53 y.o. male presents today for elective cardiac catheterization.  53 y.o. Sajith speaking male presents today for elective cardiac catheterization. The patient c/o L sided chest pain, on and off, started 1 month ago, 4-5/10 aggravate by itself, doesn't get worse with exertion. The patient denies  SOB, palpitations, presyncope, syncope,  headache, visual disturbances, CVA, PE, DVT, ARMAND, abdominal pain, N/V/D/C, hematochezia, melena, dysuria, hematuria, fever, chills. The patient was evaluated by a cardiologist, was found to have abnormal stress test. Due to patient's symptoms and abnormal non invasive findings, the patient  was recommended to have cardiac catheterization. The patient denies any complaints at present.

## 2023-11-16 NOTE — PATIENT PROFILE ADULT - FUNCTIONAL ASSESSMENT - BASIC MOBILITY SCORE.
Please advise on [facility] Message regarding refill of birth control.      Last seen on 4/12/21  Upcoming appointment-none  Last refilled-outside provider     24

## 2023-11-16 NOTE — PATIENT PROFILE ADULT - FALL HARM RISK - RISK INTERVENTIONS
Assistance OOB with selected safe patient handling equipment/Assistance with ambulation/Communicate Fall Risk and Risk Factors to all staff, patient, and family/Monitor gait and stability/Reinforce activity limits and safety measures with patient and family/Sit up slowly, dangle for a short time, stand at bedside before walking/Use of alarms - bed, chair and/or voice tab/Visual Cue: Yellow wristband/Bed in lowest position, wheels locked, appropriate side rails in place/Call bell, personal items and telephone in reach/Instruct patient to call for assistance before getting out of bed or chair/Non-slip footwear when patient is out of bed/[place] to call system/Physically safe environment - no spills, clutter or unnecessary equipment/Purposeful Proactive Rounding/Room/bathroom lighting operational, light cord in reach

## 2023-11-16 NOTE — H&P CARDIOLOGY - NSICDXPASTMEDICALHX_GEN_ALL_CORE_FT
PAST MEDICAL HISTORY:  No pertinent past medical history      PAST MEDICAL HISTORY:  HLD (hyperlipidemia)     HTN (hypertension), benign

## 2023-11-17 ENCOUNTER — TRANSCRIPTION ENCOUNTER (OUTPATIENT)
Age: 53
End: 2023-11-17

## 2023-11-17 VITALS
OXYGEN SATURATION: 100 % | RESPIRATION RATE: 12 BRPM | HEART RATE: 73 BPM | SYSTOLIC BLOOD PRESSURE: 109 MMHG | TEMPERATURE: 98 F | DIASTOLIC BLOOD PRESSURE: 67 MMHG

## 2023-11-17 LAB
ALBUMIN SERPL ELPH-MCNC: 4.1 G/DL — SIGNIFICANT CHANGE UP (ref 3.3–5)
ALBUMIN SERPL ELPH-MCNC: 4.1 G/DL — SIGNIFICANT CHANGE UP (ref 3.3–5)
ALP SERPL-CCNC: 81 U/L — SIGNIFICANT CHANGE UP (ref 40–120)
ALP SERPL-CCNC: 81 U/L — SIGNIFICANT CHANGE UP (ref 40–120)
ALT FLD-CCNC: 20 U/L — SIGNIFICANT CHANGE UP (ref 4–41)
ALT FLD-CCNC: 20 U/L — SIGNIFICANT CHANGE UP (ref 4–41)
ANION GAP SERPL CALC-SCNC: 14 MMOL/L — SIGNIFICANT CHANGE UP (ref 7–14)
ANION GAP SERPL CALC-SCNC: 14 MMOL/L — SIGNIFICANT CHANGE UP (ref 7–14)
AST SERPL-CCNC: 16 U/L — SIGNIFICANT CHANGE UP (ref 4–40)
AST SERPL-CCNC: 16 U/L — SIGNIFICANT CHANGE UP (ref 4–40)
BASOPHILS # BLD AUTO: 0.04 K/UL — SIGNIFICANT CHANGE UP (ref 0–0.2)
BASOPHILS # BLD AUTO: 0.04 K/UL — SIGNIFICANT CHANGE UP (ref 0–0.2)
BASOPHILS NFR BLD AUTO: 0.6 % — SIGNIFICANT CHANGE UP (ref 0–2)
BASOPHILS NFR BLD AUTO: 0.6 % — SIGNIFICANT CHANGE UP (ref 0–2)
BILIRUB SERPL-MCNC: 0.7 MG/DL — SIGNIFICANT CHANGE UP (ref 0.2–1.2)
BILIRUB SERPL-MCNC: 0.7 MG/DL — SIGNIFICANT CHANGE UP (ref 0.2–1.2)
BUN SERPL-MCNC: 13 MG/DL — SIGNIFICANT CHANGE UP (ref 7–23)
BUN SERPL-MCNC: 13 MG/DL — SIGNIFICANT CHANGE UP (ref 7–23)
CALCIUM SERPL-MCNC: 9.3 MG/DL — SIGNIFICANT CHANGE UP (ref 8.4–10.5)
CALCIUM SERPL-MCNC: 9.3 MG/DL — SIGNIFICANT CHANGE UP (ref 8.4–10.5)
CHLORIDE SERPL-SCNC: 103 MMOL/L — SIGNIFICANT CHANGE UP (ref 98–107)
CHLORIDE SERPL-SCNC: 103 MMOL/L — SIGNIFICANT CHANGE UP (ref 98–107)
CO2 SERPL-SCNC: 23 MMOL/L — SIGNIFICANT CHANGE UP (ref 22–31)
CO2 SERPL-SCNC: 23 MMOL/L — SIGNIFICANT CHANGE UP (ref 22–31)
CREAT SERPL-MCNC: 0.9 MG/DL — SIGNIFICANT CHANGE UP (ref 0.5–1.3)
CREAT SERPL-MCNC: 0.9 MG/DL — SIGNIFICANT CHANGE UP (ref 0.5–1.3)
EGFR: 102 ML/MIN/1.73M2 — SIGNIFICANT CHANGE UP
EGFR: 102 ML/MIN/1.73M2 — SIGNIFICANT CHANGE UP
EOSINOPHIL # BLD AUTO: 0.22 K/UL — SIGNIFICANT CHANGE UP (ref 0–0.5)
EOSINOPHIL # BLD AUTO: 0.22 K/UL — SIGNIFICANT CHANGE UP (ref 0–0.5)
EOSINOPHIL NFR BLD AUTO: 3.2 % — SIGNIFICANT CHANGE UP (ref 0–6)
EOSINOPHIL NFR BLD AUTO: 3.2 % — SIGNIFICANT CHANGE UP (ref 0–6)
GLUCOSE SERPL-MCNC: 100 MG/DL — HIGH (ref 70–99)
GLUCOSE SERPL-MCNC: 100 MG/DL — HIGH (ref 70–99)
HCT VFR BLD CALC: 44.1 % — SIGNIFICANT CHANGE UP (ref 39–50)
HCT VFR BLD CALC: 44.1 % — SIGNIFICANT CHANGE UP (ref 39–50)
HGB BLD-MCNC: 14.8 G/DL — SIGNIFICANT CHANGE UP (ref 13–17)
HGB BLD-MCNC: 14.8 G/DL — SIGNIFICANT CHANGE UP (ref 13–17)
IANC: 4.53 K/UL — SIGNIFICANT CHANGE UP (ref 1.8–7.4)
IANC: 4.53 K/UL — SIGNIFICANT CHANGE UP (ref 1.8–7.4)
IMM GRANULOCYTES NFR BLD AUTO: 0.3 % — SIGNIFICANT CHANGE UP (ref 0–0.9)
IMM GRANULOCYTES NFR BLD AUTO: 0.3 % — SIGNIFICANT CHANGE UP (ref 0–0.9)
LYMPHOCYTES # BLD AUTO: 1.44 K/UL — SIGNIFICANT CHANGE UP (ref 1–3.3)
LYMPHOCYTES # BLD AUTO: 1.44 K/UL — SIGNIFICANT CHANGE UP (ref 1–3.3)
LYMPHOCYTES # BLD AUTO: 21.2 % — SIGNIFICANT CHANGE UP (ref 13–44)
LYMPHOCYTES # BLD AUTO: 21.2 % — SIGNIFICANT CHANGE UP (ref 13–44)
MCHC RBC-ENTMCNC: 29.9 PG — SIGNIFICANT CHANGE UP (ref 27–34)
MCHC RBC-ENTMCNC: 29.9 PG — SIGNIFICANT CHANGE UP (ref 27–34)
MCHC RBC-ENTMCNC: 33.6 GM/DL — SIGNIFICANT CHANGE UP (ref 32–36)
MCHC RBC-ENTMCNC: 33.6 GM/DL — SIGNIFICANT CHANGE UP (ref 32–36)
MCV RBC AUTO: 89.1 FL — SIGNIFICANT CHANGE UP (ref 80–100)
MCV RBC AUTO: 89.1 FL — SIGNIFICANT CHANGE UP (ref 80–100)
MONOCYTES # BLD AUTO: 0.54 K/UL — SIGNIFICANT CHANGE UP (ref 0–0.9)
MONOCYTES # BLD AUTO: 0.54 K/UL — SIGNIFICANT CHANGE UP (ref 0–0.9)
MONOCYTES NFR BLD AUTO: 8 % — SIGNIFICANT CHANGE UP (ref 2–14)
MONOCYTES NFR BLD AUTO: 8 % — SIGNIFICANT CHANGE UP (ref 2–14)
NEUTROPHILS # BLD AUTO: 4.53 K/UL — SIGNIFICANT CHANGE UP (ref 1.8–7.4)
NEUTROPHILS # BLD AUTO: 4.53 K/UL — SIGNIFICANT CHANGE UP (ref 1.8–7.4)
NEUTROPHILS NFR BLD AUTO: 66.7 % — SIGNIFICANT CHANGE UP (ref 43–77)
NEUTROPHILS NFR BLD AUTO: 66.7 % — SIGNIFICANT CHANGE UP (ref 43–77)
NRBC # BLD: 0 /100 WBCS — SIGNIFICANT CHANGE UP (ref 0–0)
NRBC # BLD: 0 /100 WBCS — SIGNIFICANT CHANGE UP (ref 0–0)
NRBC # FLD: 0 K/UL — SIGNIFICANT CHANGE UP (ref 0–0)
NRBC # FLD: 0 K/UL — SIGNIFICANT CHANGE UP (ref 0–0)
PLATELET # BLD AUTO: 201 K/UL — SIGNIFICANT CHANGE UP (ref 150–400)
PLATELET # BLD AUTO: 201 K/UL — SIGNIFICANT CHANGE UP (ref 150–400)
POTASSIUM SERPL-MCNC: 4 MMOL/L — SIGNIFICANT CHANGE UP (ref 3.5–5.3)
POTASSIUM SERPL-MCNC: 4 MMOL/L — SIGNIFICANT CHANGE UP (ref 3.5–5.3)
POTASSIUM SERPL-SCNC: 4 MMOL/L — SIGNIFICANT CHANGE UP (ref 3.5–5.3)
POTASSIUM SERPL-SCNC: 4 MMOL/L — SIGNIFICANT CHANGE UP (ref 3.5–5.3)
PROT SERPL-MCNC: 6.8 G/DL — SIGNIFICANT CHANGE UP (ref 6–8.3)
PROT SERPL-MCNC: 6.8 G/DL — SIGNIFICANT CHANGE UP (ref 6–8.3)
RBC # BLD: 4.95 M/UL — SIGNIFICANT CHANGE UP (ref 4.2–5.8)
RBC # BLD: 4.95 M/UL — SIGNIFICANT CHANGE UP (ref 4.2–5.8)
RBC # FLD: 12.2 % — SIGNIFICANT CHANGE UP (ref 10.3–14.5)
RBC # FLD: 12.2 % — SIGNIFICANT CHANGE UP (ref 10.3–14.5)
SODIUM SERPL-SCNC: 140 MMOL/L — SIGNIFICANT CHANGE UP (ref 135–145)
SODIUM SERPL-SCNC: 140 MMOL/L — SIGNIFICANT CHANGE UP (ref 135–145)
WBC # BLD: 6.79 K/UL — SIGNIFICANT CHANGE UP (ref 3.8–10.5)
WBC # BLD: 6.79 K/UL — SIGNIFICANT CHANGE UP (ref 3.8–10.5)
WBC # FLD AUTO: 6.79 K/UL — SIGNIFICANT CHANGE UP (ref 3.8–10.5)
WBC # FLD AUTO: 6.79 K/UL — SIGNIFICANT CHANGE UP (ref 3.8–10.5)

## 2023-11-17 RX ORDER — ACETAMINOPHEN 500 MG
650 TABLET ORAL ONCE
Refills: 0 | Status: COMPLETED | OUTPATIENT
Start: 2023-11-17 | End: 2023-11-17

## 2023-11-17 RX ADMIN — Medication 650 MILLIGRAM(S): at 05:55

## 2023-11-17 RX ADMIN — SODIUM CHLORIDE 3 MILLILITER(S): 9 INJECTION INTRAMUSCULAR; INTRAVENOUS; SUBCUTANEOUS at 05:56

## 2023-11-17 RX ADMIN — SODIUM CHLORIDE 3 MILLILITER(S): 9 INJECTION INTRAMUSCULAR; INTRAVENOUS; SUBCUTANEOUS at 11:33

## 2023-11-17 NOTE — DISCHARGE NOTE PROVIDER - NS AS DC PROVIDER CONTACT Y/N MULTI
Daily Note     Today's date: 2020  Patient name: Olayinka Waterman  : 1959  MRN: [id number]  Referring provider: Jennifer Bolanos MD  Dx:   Encounter Diagnosis     ICD-10-CM    1  Generalized weakness  R53 1    2  Spinal cord ependymoma (HCC)  C72 0    3  Malignant neoplasm of spinal cord (HCC)  C72 0    4  H/O spinal fusion  Z98 1    5  Radiation-induced myelopathy (HCC)  G95 89                   Subjective:  Pt reports he feels his balance is improving and R leg starting to feel stronger      Objective: See treatment diary below      Assessment:  Pt cont to amb with MAFO and without assistive device with occasional toe catch, depending on pt's fatigue level, but overall pt doing better, able to perform chores around house on all levels      Plan: Continue per plan of care  Progress treatment as tolerated  Precautions: Cervical tumor, DM2, falls risk      Manuals                                                                 Neuro Re-Ed 8/3 8/7 8/14 8/21/20         TA contraction 2x10x5 2x10x5"           Multifidi contraction 2*10*5 S/l 2x10, 3" ea  Tandem stance foam 2*30" b/l 2*30" B/L 4 laps tandem walking 4 laps tandem walking         Sidestepping     across floor 4x20'         Step-ups 2*10 3x10 ea, 6" 3*10 6" ea 8" 2x10 ea         Biodex    LOS lv 3 4x 7 dynamic LOS skill lvl3 dynamic lvl 7 x5         Sitting on PB reaching for cones    CG of 1 and assist of another for cones         Ther Ex 8/3 8/7 8/14 8/21/20         Nu step 10' 10' 10' 10'         TM 5' 5' 5' 5'         Leg press 3*10 95# 3x10 125# 3*10 125# 125# 3x10         Stand hip abd   3*10 YTB ea  3*10 YTB ea  Stand hip extn  3*10 YTB ea  3*10 YTB ea                                                   Ther Activity                                       Gait Training                                       Modalities
Yes

## 2023-11-17 NOTE — DISCHARGE NOTE PROVIDER - CARE PROVIDER_API CALL
Rashmi James  Interventional Cardiology  [id number] [place] [id number]  Phone: [phone]  Fax: [phone]  Established Patient  Follow Up Time:

## 2023-11-17 NOTE — DISCHARGE NOTE PROVIDER - NSDCMRMEDTOKEN_GEN_ALL_CORE_FT
aspirin 81 mg oral delayed release tablet: 1 tab(s) orally once a day  atorvastatin 40 mg oral tablet: 1 tab(s) orally once a day (at bedtime)  clopidogrel 75 mg oral tablet: 1 tab(s) orally once a day  metoprolol succinate 50 mg oral tablet, extended release: 1 tab(s) orally once a day  valsartan 40 mg oral tablet: 1 tab(s) orally once a day

## 2023-11-17 NOTE — DISCHARGE NOTE NURSING/CASE MANAGEMENT/SOCIAL WORK - NSDCPEFALRISK_GEN_ALL_CORE
For information on Fall & Injury Prevention, visit: [email or web address]  [email or web address] OR  https://www.cdc.gov/steadi/patient.html

## 2023-11-17 NOTE — DISCHARGE NOTE PROVIDER - NSDCCPCAREPLAN_GEN_ALL_CORE_FT
PRINCIPAL DISCHARGE DIAGNOSIS  Diagnosis: CAD (coronary artery disease)  Assessment and Plan of Treatment: Continue aspirin, plavix, metoprolol, zocor, valsartan  Follow up with your cardiologist Dr James in one week

## 2023-11-17 NOTE — DISCHARGE NOTE NURSING/CASE MANAGEMENT/SOCIAL WORK - PATIENT PORTAL LINK FT
You can access the FollowMyHealth Patient Portal offered by [facility] by registering at the following website: [email or web address]. By joining [employer]’s FollowMyHealth portal, you will also be able to view your health information using other applications (apps) compatible with our system.

## 2023-11-17 NOTE — DISCHARGE NOTE PROVIDER - HOSPITAL COURSE
53 y.o ..male presents today for elective cardiac cath due to abnormal stress test.   11/16 LHC - Luminal, patent stent to LAD  RRA access: No hematoma  Case discussed with cardiology. Patient stable for discharge. Follow up with cardiolgsist as outpatient 53 y.o ..male presents today for elective cardiac cath due to abnormal stress test.   11/16 LHC - Luminal, patent stent to LAD  RRA access: No hematoma  Case discussed with cardiology. Patient stable for discharge. Follow up with cardiolgist as outpatient. Case discussed with with Dr Wendi chu

## 2023-12-28 ENCOUNTER — RX RENEWAL (OUTPATIENT)
Age: 53
End: 2023-12-28

## 2024-02-07 PROBLEM — I10 ESSENTIAL (PRIMARY) HYPERTENSION: Chronic | Status: ACTIVE | Noted: 2023-11-16

## 2024-02-07 PROBLEM — E78.5 HYPERLIPIDEMIA, UNSPECIFIED: Chronic | Status: ACTIVE | Noted: 2023-11-16

## 2024-02-28 ENCOUNTER — APPOINTMENT (OUTPATIENT)
Dept: CARDIOLOGY | Facility: HOSPITAL | Age: 54
End: 2024-02-28

## 2024-02-28 ENCOUNTER — NON-APPOINTMENT (OUTPATIENT)
Age: 54
End: 2024-02-28

## 2024-02-28 ENCOUNTER — OUTPATIENT (OUTPATIENT)
Dept: OUTPATIENT SERVICES | Facility: HOSPITAL | Age: 54
LOS: 1 days | End: 2024-02-28
Payer: SELF-PAY

## 2024-02-28 VITALS
BODY MASS INDEX: 35.79 KG/M2 | HEIGHT: 70 IN | OXYGEN SATURATION: 99 % | HEART RATE: 80 BPM | WEIGHT: 250 LBS | SYSTOLIC BLOOD PRESSURE: 121 MMHG | DIASTOLIC BLOOD PRESSURE: 76 MMHG

## 2024-02-28 DIAGNOSIS — I25.10 ATHEROSCLEROTIC HEART DISEASE OF NATIVE CORONARY ARTERY WITHOUT ANGINA PECTORIS: ICD-10-CM

## 2024-02-28 DIAGNOSIS — Z98.890 OTHER SPECIFIED POSTPROCEDURAL STATES: Chronic | ICD-10-CM

## 2024-02-28 PROCEDURE — 93005 ELECTROCARDIOGRAM TRACING: CPT

## 2024-02-28 PROCEDURE — G0463: CPT

## 2024-02-28 NOTE — HISTORY OF PRESENT ILLNESS
[FreeTextEntry1] : 55 yo M with hx of AWSTEMI in 10/2022 s/p LHC found pLAD 99%, D1 60%, D2 70% s/p LE x1 to pLAD. S/p short CICU stay. TTE showed EF 51% with no WMA and mild MR. Most recent LDL was 71 on 8/2023.  Patient had some exertional chest pain a few months ago and given hx and risk factors, we sent him for LHC in 11/2023 that showed mild CAD with no flow limiting lesions and patent LAD stent. Today patient said his CP is no longer occurring and he has no other symptoms. Repeat TTE in 10/2023 was normal.

## 2024-02-28 NOTE — PHYSICAL EXAM
[Well Nourished] : well nourished [Well Developed] : well developed [Normal Conjunctiva] : normal conjunctiva [No Acute Distress] : no acute distress [No Carotid Bruit] : no carotid bruit [Normal Venous Pressure] : normal venous pressure [No Murmur] : no murmur [Normal S1, S2] : normal S1, S2 [No Rub] : no rub [No Gallop] : no gallop [Clear Lung Fields] : clear lung fields [No Respiratory Distress] : no respiratory distress  [Good Air Entry] : good air entry [Soft] : abdomen soft [No Masses/organomegaly] : no masses/organomegaly [Non Tender] : non-tender [Normal Gait] : normal gait [Normal Bowel Sounds] : normal bowel sounds [No Edema] : no edema [No Cyanosis] : no cyanosis [No Clubbing] : no clubbing [No Varicosities] : no varicosities [No Rash] : no rash [Moves all extremities] : moves all extremities [No Skin Lesions] : no skin lesions [No Focal Deficits] : no focal deficits [Normal Speech] : normal speech [Normal memory] : normal memory [Alert and Oriented] : alert and oriented

## 2024-02-28 NOTE — DISCUSSION/SUMMARY
[FreeTextEntry1] : 55 yo M with hx of AWSTEMI in 10/2022 s/p 1 LE to pLAD, recent chest pain s/p LHC on 11/2023 with mild CAD and patent LAD stent, TTE with normal EF, is here for follow up.   #CAD - TTE from 10/2023 with normal EF, diastolic function, RV function - most recent LHC in 11/2023 with mild CAD - c/w asa 81 daily - stopped plavix  - c/w atorvastatin 80 daily - c/w toprol 50 daily - c/w valsartan 40 daily  F/u in 6 months for fasting blood work

## 2024-03-11 ENCOUNTER — APPOINTMENT (OUTPATIENT)
Dept: INTERNAL MEDICINE | Facility: CLINIC | Age: 54
End: 2024-03-11
Payer: COMMERCIAL

## 2024-03-11 ENCOUNTER — OUTPATIENT (OUTPATIENT)
Dept: OUTPATIENT SERVICES | Facility: HOSPITAL | Age: 54
LOS: 1 days | End: 2024-03-11
Payer: SELF-PAY

## 2024-03-11 VITALS
HEART RATE: 75 BPM | OXYGEN SATURATION: 96 % | SYSTOLIC BLOOD PRESSURE: 118 MMHG | WEIGHT: 245 LBS | DIASTOLIC BLOOD PRESSURE: 80 MMHG | HEIGHT: 70 IN | BODY MASS INDEX: 35.07 KG/M2

## 2024-03-11 DIAGNOSIS — R05.9 COUGH, UNSPECIFIED: ICD-10-CM

## 2024-03-11 DIAGNOSIS — Z98.890 OTHER SPECIFIED POSTPROCEDURAL STATES: Chronic | ICD-10-CM

## 2024-03-11 DIAGNOSIS — I21.09 ST ELEVATION (STEMI) MYOCARDIAL INFARCTION INVOLVING OTHER CORONARY ARTERY OF ANTERIOR WALL: ICD-10-CM

## 2024-03-11 DIAGNOSIS — R73.03 PREDIABETES.: ICD-10-CM

## 2024-03-11 DIAGNOSIS — Z23 ENCOUNTER FOR IMMUNIZATION: ICD-10-CM

## 2024-03-11 DIAGNOSIS — E78.6 HYPERLIPIDEMIA, UNSPECIFIED: ICD-10-CM

## 2024-03-11 DIAGNOSIS — E78.5 HYPERLIPIDEMIA, UNSPECIFIED: ICD-10-CM

## 2024-03-11 DIAGNOSIS — I10 ESSENTIAL (PRIMARY) HYPERTENSION: ICD-10-CM

## 2024-03-11 PROCEDURE — 99213 OFFICE O/P EST LOW 20 MIN: CPT | Mod: 25

## 2024-03-11 PROCEDURE — 90750 HZV VACC RECOMBINANT IM: CPT

## 2024-03-11 PROCEDURE — G0463: CPT | Mod: 25

## 2024-03-11 PROCEDURE — 90471 IMMUNIZATION ADMIN: CPT

## 2024-03-11 RX ORDER — DULAGLUTIDE 0.75 MG/.5ML
0.75 INJECTION, SOLUTION SUBCUTANEOUS
Qty: 6 | Refills: 0 | Status: ACTIVE | COMMUNITY
Start: 2023-12-28 | End: 1900-01-01

## 2024-03-11 RX ORDER — EZETIMIBE 10 MG/1
10 TABLET ORAL
Qty: 90 | Refills: 0 | Status: ACTIVE | COMMUNITY
Start: 2024-03-11 | End: 1900-01-01

## 2024-03-11 NOTE — PHYSICAL EXAM
[No Acute Distress] : no acute distress [Well Nourished] : well nourished [Well-Appearing] : well-appearing [No Respiratory Distress] : no respiratory distress  [Clear to Auscultation] : lungs were clear to auscultation bilaterally [No Accessory Muscle Use] : no accessory muscle use [Normal Rate] : normal rate  [Regular Rhythm] : with a regular rhythm [No Murmur] : no murmur heard [Normal S1, S2] : normal S1 and S2 [Non Tender] : non-tender [Soft] : abdomen soft [Non-distended] : non-distended [No Rash] : no rash

## 2024-03-12 NOTE — END OF VISIT
[] : Resident [FreeTextEntry3] : 2 episodes of teaspoon of blood in context of cough now resolved; rec cxr; fu 5w with lab workup if recurs.

## 2024-03-12 NOTE — REVIEW OF SYSTEMS
[Negative] : Neurological [Cough] : cough [Fever] : no fever [Chills] : no chills [Fatigue] : no fatigue [Night Sweats] : no night sweats [Recent Change In Weight] : ~T no recent weight change [Chest Pain] : no chest pain [Palpitations] : no palpitations [Lower Ext Edema] : no lower extremity edema [Orthopena] : no orthopnea [Shortness Of Breath] : no shortness of breath [Wheezing] : no wheezing [Dyspnea on Exertion] : not dyspnea on exertion

## 2024-03-12 NOTE — HEALTH RISK ASSESSMENT
[Yes] : Yes [2 - 4 times a month (2 pts)] : 2-4 times a month (2 points) [Never (0 pts)] : Never (0 points) [1 or 2 (0 pts)] : 1 or 2 (0 points) [No falls in past year] : Patient reported no falls in the past year [Never] : Never [de-identified] : denies. [de-identified] : home-cooked meals, rice, vegetables, rice.

## 2024-03-12 NOTE — HISTORY OF PRESENT ILLNESS
[FreeTextEntry1] : 54M presenting for follow-up.  [de-identified] : Pt c/o cough x 2 weeks with 3 instances of hemoptysis during the first week, no blood within the last week. Endorses blood-tinged sputum being less than a teaspoon in size. Cough occurs at random intervals throughout the day and is alleviated with drinking water; no other palliating or provoking factors. Did not begin w/ viral prodrome, no association with reflux symptoms. Denies fevers, chills, night sweats, SOB, CP, dyspnea, palpitations, weight loss, epistaxis, skin changes.  Pt has been adherent with taking all of his medications as prescribed. Using Trulicity once weekly.   Pt is interested in receiving Shingles vaccine today.

## 2024-03-12 NOTE — ASSESSMENT
[FreeTextEntry1] : 54M w/ history AWSTEMI w/ normalization of EF presenting for follow-up in s/o pre-DM, CAD, tolerating Trulicity w/o issue. 1 episode non-exertional CP, self-resolved, no associated symptoms, w/o active chest pain at visit.  Cough: started randomly, ongoing for 2 weeks w/ small amount of hemoptysis 3x though none in the last week. Considering allergies, post-nasal drip, GERD; want to r/o pulm malignancy, CHF - causing fluid overload and frothy sputum though no other signs/symptoms and reassured by normal TTE Oct 2023.  -Pt given referral to make appt for X-ray. -Advised pt to report to ED if he experiences more hemoptysis, especially if there is a large amount of blood or if it becomes more frequent   HLD: on high intensity statin w/ LDL 71.  - given high-risk w/ prev AWSTEMI and pre-DM, target LDL of 55 and patient is agreeable to starting ezetimibe 10mg qd (via FELIX)  - counseled on nutrition/exercise at current visit  HCM -Shingles vaccine administered today. -COVID, Flu, Prevnar 20 UTD  Case d/w Dr. Lacy

## 2024-03-12 NOTE — INTERPRETER SERVICES
[[employer] ] : provided by [employer]   [Interpreters_IDNumber] : [id number]   [FreeTextEntry3] : [place] [Interpreters_FullName] : Sharif

## 2024-03-19 DIAGNOSIS — E78.5 HYPERLIPIDEMIA, UNSPECIFIED: ICD-10-CM

## 2024-03-19 DIAGNOSIS — Z23 ENCOUNTER FOR IMMUNIZATION: ICD-10-CM

## 2024-03-19 DIAGNOSIS — R05.9 COUGH, UNSPECIFIED: ICD-10-CM

## 2024-03-19 DIAGNOSIS — R73.03 PREDIABETES: ICD-10-CM

## 2024-03-19 DIAGNOSIS — E78.6 LIPOPROTEIN DEFICIENCY: ICD-10-CM

## 2024-03-19 DIAGNOSIS — I21.09 ST ELEVATION (STEMI) MYOCARDIAL INFARCTION INVOLVING OTHER CORONARY ARTERY OF ANTERIOR WALL: ICD-10-CM

## 2024-04-06 ENCOUNTER — EMERGENCY (EMERGENCY)
Facility: HOSPITAL | Age: 54
LOS: 1 days | Discharge: HOME | End: 2024-04-06
Attending: EMERGENCY MEDICINE
Payer: MEDICAID

## 2024-04-06 VITALS
TEMPERATURE: 98 F | HEART RATE: 80 BPM | RESPIRATION RATE: 18 BRPM | DIASTOLIC BLOOD PRESSURE: 81 MMHG | WEIGHT: 210.1 LBS | SYSTOLIC BLOOD PRESSURE: 132 MMHG | HEIGHT: 69 IN | OXYGEN SATURATION: 99 %

## 2024-04-06 DIAGNOSIS — J04.30 SUPRAGLOTTITIS, UNSPECIFIED, WITHOUT OBSTRUCTION: ICD-10-CM

## 2024-04-06 DIAGNOSIS — Z98.890 OTHER SPECIFIED POSTPROCEDURAL STATES: Chronic | ICD-10-CM

## 2024-04-06 LAB
ALBUMIN SERPL ELPH-MCNC: 4.3 G/DL — SIGNIFICANT CHANGE UP (ref 3.3–5)
ALP SERPL-CCNC: 73 U/L — SIGNIFICANT CHANGE UP (ref 40–120)
ALT FLD-CCNC: 22 U/L — SIGNIFICANT CHANGE UP (ref 10–45)
ANION GAP SERPL CALC-SCNC: 12 MMOL/L — SIGNIFICANT CHANGE UP (ref 5–17)
APTT BLD: 32.4 SEC — SIGNIFICANT CHANGE UP (ref 24.5–35.6)
AST SERPL-CCNC: 15 U/L — SIGNIFICANT CHANGE UP (ref 10–40)
BASOPHILS # BLD AUTO: 0.05 K/UL — SIGNIFICANT CHANGE UP (ref 0–0.2)
BASOPHILS NFR BLD AUTO: 0.4 % — SIGNIFICANT CHANGE UP (ref 0–2)
BILIRUB SERPL-MCNC: 0.9 MG/DL — SIGNIFICANT CHANGE UP (ref 0.2–1.2)
BUN SERPL-MCNC: 11 MG/DL — SIGNIFICANT CHANGE UP (ref 7–23)
CALCIUM SERPL-MCNC: 9.5 MG/DL — SIGNIFICANT CHANGE UP (ref 8.4–10.5)
CHLORIDE SERPL-SCNC: 105 MMOL/L — SIGNIFICANT CHANGE UP (ref 96–108)
CO2 SERPL-SCNC: 23 MMOL/L — SIGNIFICANT CHANGE UP (ref 22–31)
CREAT SERPL-MCNC: 0.88 MG/DL — SIGNIFICANT CHANGE UP (ref 0.5–1.3)
EGFR: 102 ML/MIN/1.73M2 — SIGNIFICANT CHANGE UP
EOSINOPHIL # BLD AUTO: 0.13 K/UL — SIGNIFICANT CHANGE UP (ref 0–0.5)
EOSINOPHIL NFR BLD AUTO: 1.1 % — SIGNIFICANT CHANGE UP (ref 0–6)
FLUAV AG NPH QL: SIGNIFICANT CHANGE UP
FLUBV AG NPH QL: SIGNIFICANT CHANGE UP
GLUCOSE SERPL-MCNC: 102 MG/DL — HIGH (ref 70–99)
HCT VFR BLD CALC: 43.5 % — SIGNIFICANT CHANGE UP (ref 39–50)
HGB BLD-MCNC: 14.8 G/DL — SIGNIFICANT CHANGE UP (ref 13–17)
IMM GRANULOCYTES NFR BLD AUTO: 0.3 % — SIGNIFICANT CHANGE UP (ref 0–0.9)
INR BLD: 1.16 RATIO — SIGNIFICANT CHANGE UP (ref 0.85–1.18)
LYMPHOCYTES # BLD AUTO: 1.38 K/UL — SIGNIFICANT CHANGE UP (ref 1–3.3)
LYMPHOCYTES # BLD AUTO: 11.9 % — LOW (ref 13–44)
MCHC RBC-ENTMCNC: 30.2 PG — SIGNIFICANT CHANGE UP (ref 27–34)
MCHC RBC-ENTMCNC: 34 GM/DL — SIGNIFICANT CHANGE UP (ref 32–36)
MCV RBC AUTO: 88.8 FL — SIGNIFICANT CHANGE UP (ref 80–100)
MONOCYTES # BLD AUTO: 1.04 K/UL — HIGH (ref 0–0.9)
MONOCYTES NFR BLD AUTO: 9 % — SIGNIFICANT CHANGE UP (ref 2–14)
NEUTROPHILS # BLD AUTO: 8.97 K/UL — HIGH (ref 1.8–7.4)
NEUTROPHILS NFR BLD AUTO: 77.3 % — HIGH (ref 43–77)
NRBC # BLD: 0 /100 WBCS — SIGNIFICANT CHANGE UP (ref 0–0)
PLATELET # BLD AUTO: 203 K/UL — SIGNIFICANT CHANGE UP (ref 150–400)
POTASSIUM SERPL-MCNC: 4.4 MMOL/L — SIGNIFICANT CHANGE UP (ref 3.5–5.3)
POTASSIUM SERPL-SCNC: 4.4 MMOL/L — SIGNIFICANT CHANGE UP (ref 3.5–5.3)
PROT SERPL-MCNC: 7.7 G/DL — SIGNIFICANT CHANGE UP (ref 6–8.3)
PROTHROM AB SERPL-ACNC: 12.1 SEC — SIGNIFICANT CHANGE UP (ref 9.5–13)
RBC # BLD: 4.9 M/UL — SIGNIFICANT CHANGE UP (ref 4.2–5.8)
RBC # FLD: 12.1 % — SIGNIFICANT CHANGE UP (ref 10.3–14.5)
RSV RNA NPH QL NAA+NON-PROBE: SIGNIFICANT CHANGE UP
S PYO AG SPEC QL IA: NEGATIVE — SIGNIFICANT CHANGE UP
SARS-COV-2 RNA SPEC QL NAA+PROBE: SIGNIFICANT CHANGE UP
SODIUM SERPL-SCNC: 140 MMOL/L — SIGNIFICANT CHANGE UP (ref 135–145)
WBC # BLD: 11.61 K/UL — HIGH (ref 3.8–10.5)
WBC # FLD AUTO: 11.61 K/UL — HIGH (ref 3.8–10.5)

## 2024-04-06 PROCEDURE — 99223 1ST HOSP IP/OBS HIGH 75: CPT

## 2024-04-06 PROCEDURE — 31505 DIAGNOSTIC LARYNGOSCOPY: CPT

## 2024-04-06 PROCEDURE — 70490 CT SOFT TISSUE NECK W/O DYE: CPT | Mod: 26,MC

## 2024-04-06 PROCEDURE — 76377 3D RENDER W/INTRP POSTPROCES: CPT | Mod: 26

## 2024-04-06 RX ORDER — FAMOTIDINE 10 MG/ML
20 INJECTION INTRAVENOUS ONCE
Refills: 0 | Status: COMPLETED | OUTPATIENT
Start: 2024-04-06 | End: 2024-04-06

## 2024-04-06 RX ORDER — KETOROLAC TROMETHAMINE 30 MG/ML
15 SYRINGE (ML) INJECTION EVERY 6 HOURS
Refills: 0 | Status: COMPLETED | OUTPATIENT
Start: 2024-04-06 | End: 2024-04-11

## 2024-04-06 RX ORDER — AMPICILLIN SODIUM AND SULBACTAM SODIUM 250; 125 MG/ML; MG/ML
1.5 INJECTION, POWDER, FOR SUSPENSION INTRAMUSCULAR; INTRAVENOUS ONCE
Refills: 0 | Status: COMPLETED | OUTPATIENT
Start: 2024-04-06 | End: 2024-04-06

## 2024-04-06 RX ORDER — CEFTRIAXONE 500 MG/1
1000 INJECTION, POWDER, FOR SOLUTION INTRAMUSCULAR; INTRAVENOUS ONCE
Refills: 0 | Status: COMPLETED | OUTPATIENT
Start: 2024-04-06 | End: 2024-04-06

## 2024-04-06 RX ORDER — DEXAMETHASONE 0.5 MG/5ML
8 ELIXIR ORAL EVERY 8 HOURS
Refills: 0 | Status: DISCONTINUED | OUTPATIENT
Start: 2024-04-06 | End: 2024-04-09

## 2024-04-06 RX ORDER — FAMOTIDINE 10 MG/ML
20 INJECTION INTRAVENOUS
Refills: 0 | Status: DISCONTINUED | OUTPATIENT
Start: 2024-04-06 | End: 2024-04-09

## 2024-04-06 RX ORDER — DEXAMETHASONE 0.5 MG/5ML
8 ELIXIR ORAL ONCE
Refills: 0 | Status: COMPLETED | OUTPATIENT
Start: 2024-04-06 | End: 2024-04-06

## 2024-04-06 RX ORDER — FAMOTIDINE 10 MG/ML
20 INJECTION INTRAVENOUS
Refills: 0 | Status: DISCONTINUED | OUTPATIENT
Start: 2024-04-06 | End: 2024-04-06

## 2024-04-06 RX ORDER — DIPHENHYDRAMINE HCL 50 MG
50 CAPSULE ORAL ONCE
Refills: 0 | Status: COMPLETED | OUTPATIENT
Start: 2024-04-06 | End: 2024-04-06

## 2024-04-06 RX ORDER — KETOROLAC TROMETHAMINE 30 MG/ML
15 SYRINGE (ML) INJECTION ONCE
Refills: 0 | Status: DISCONTINUED | OUTPATIENT
Start: 2024-04-06 | End: 2024-04-06

## 2024-04-06 RX ORDER — ACETAMINOPHEN 500 MG
975 TABLET ORAL ONCE
Refills: 0 | Status: COMPLETED | OUTPATIENT
Start: 2024-04-06 | End: 2024-04-06

## 2024-04-06 RX ORDER — SODIUM CHLORIDE 9 MG/ML
1000 INJECTION INTRAMUSCULAR; INTRAVENOUS; SUBCUTANEOUS ONCE
Refills: 0 | Status: COMPLETED | OUTPATIENT
Start: 2024-04-06 | End: 2024-04-06

## 2024-04-06 RX ADMIN — Medication 15 MILLIGRAM(S): at 19:00

## 2024-04-06 RX ADMIN — Medication 975 MILLIGRAM(S): at 16:16

## 2024-04-06 RX ADMIN — Medication 975 MILLIGRAM(S): at 13:38

## 2024-04-06 RX ADMIN — AMPICILLIN SODIUM AND SULBACTAM SODIUM 100 GRAM(S): 250; 125 INJECTION, POWDER, FOR SUSPENSION INTRAMUSCULAR; INTRAVENOUS at 16:21

## 2024-04-06 RX ADMIN — Medication 15 MILLIGRAM(S): at 17:32

## 2024-04-06 RX ADMIN — AMPICILLIN SODIUM AND SULBACTAM SODIUM 1.5 GRAM(S): 250; 125 INJECTION, POWDER, FOR SUSPENSION INTRAMUSCULAR; INTRAVENOUS at 17:15

## 2024-04-06 RX ADMIN — Medication 15 MILLIGRAM(S): at 18:42

## 2024-04-06 RX ADMIN — Medication 101.6 MILLIGRAM(S): at 17:07

## 2024-04-06 RX ADMIN — Medication 50 MILLIGRAM(S): at 16:57

## 2024-04-06 RX ADMIN — SODIUM CHLORIDE 1000 MILLILITER(S): 9 INJECTION INTRAMUSCULAR; INTRAVENOUS; SUBCUTANEOUS at 17:29

## 2024-04-06 RX ADMIN — FAMOTIDINE 20 MILLIGRAM(S): 10 INJECTION INTRAVENOUS at 16:57

## 2024-04-06 RX ADMIN — Medication 101.6 MILLIGRAM(S): at 22:26

## 2024-04-06 RX ADMIN — CEFTRIAXONE 100 MILLIGRAM(S): 500 INJECTION, POWDER, FOR SOLUTION INTRAMUSCULAR; INTRAVENOUS at 18:42

## 2024-04-06 RX ADMIN — FAMOTIDINE 20 MILLIGRAM(S): 10 INJECTION INTRAVENOUS at 18:43

## 2024-04-06 NOTE — ED CDU PROVIDER DISPOSITION NOTE - CARE PROVIDER_API CALL
Reny Islas  Otolaryngology  [address] [place] [address], [address]  [place] [address]  Phone: [phone]  Fax: [phone]  Follow Up Time:

## 2024-04-06 NOTE — CONSULT NOTE ADULT - ASSESSMENT
53yo male pt with PMHx of HTN, HLD, CAD s/p stents (in 2023, on Plavix and ASA), NKDA, non smoker presents to ED with throat pain, hoarseness, and difficult swallowing/drinking for 2 days. Reports he felt throat pain with discomfort and swallowing problem since 2 days ago. He tried to vomit forcedly due to discomfort and took pain med without improvement, unsure of the name. Neck CT showed Focal soft tissue thickening with associated edema of the posterior pharyngeal soft tissues extending from the inferior oropharynx to the hypopharynx. Post allergic process vs neoplasm vs other inflammatory process. Bedside flexible laryngoscopy revealed B/L arytenoid, A/E fold, and post-cricoid edema and erythema consistent with non obstructive supraglottitis.  WBC 11.6

## 2024-04-06 NOTE — ED ADULT TRIAGE NOTE - WEIGHT IN KG
-Follow up with me in 4 weeks  - Schedule Psychiatry and continue counseling.   - Please note that this medication can take at least one month to go into full effect, please do not be discouraged if you do not see your symptoms improving right away.  - It is important to also utilize other non pharmacological mechanisms to manage your anxiety, this includes regular exercise and healthy diet, support of friends and family, and undergoing individual psychotherapy  - Do not stop medication abruptly, please notify me if you have concerns before stopping as we will need to taper you off this medication  - Adherence is important with this medication.   - Follow up in 4 weeks.     - Continue Trazodone, can increase to 2 tablets if you feel this works better.     Zandra Vasquez APRN CNP        
95.3
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
## 2024-04-06 NOTE — ED CDU PROVIDER DISPOSITION NOTE - ATTENDING APP SHARED VISIT CONTRIBUTION OF CARE
I, Diego Roberts MD, Emergency Medicine Attending Physician, personally saw and examined the patient and I personally made/approve the management plan and take responsibility for the patient management.    Patient was signed out to me pending final ENT recommendations.      ENT recommended discharge the patient has received multiple doses of steroids and antibiotics.  At 2045, on reassessment, patient with improved symptoms.  Denies any fevers.  Stable vitals.  Repeat examination shows normal range of motion of neck, no stridor or drooling, no trismus, no uvular deviation.  Normal phonation.  Patient able to tolerate PO with both solids and liquids stable for discharge with close follow-up and strict return precautions. Discussed the indications and side-effects of applicable medications.  Informed patient of all diagnostic test results including labs and imaging. The patient has been informed of all concerning signs and symptoms to return to Emergency Department, the necessity to follow up with PMD within 2-3 days and ENT within 3-4 days was explained, or to return to the ED if unable to follow-up appropriately, and the patient reports understanding of above with capacity and insight.  Without difficulty.

## 2024-04-06 NOTE — ED CDU PROVIDER INITIAL DAY NOTE - ENMT, MLM
Airway patent. Nasal mucosa clear. Mouth with normal mucosa. patient hoarse, intermittently clearing throat

## 2024-04-06 NOTE — ED CDU PROVIDER DISPOSITION NOTE - PATIENT PORTAL LINK FT
You can access the FollowMyHealth Patient Portal offered by [facility] by registering at the following website: [email or web address]. By joining [facility]’s FollowMyHealth portal, you will also be able to view your health information using other applications (apps) compatible with our system.

## 2024-04-06 NOTE — ED ADULT NURSE REASSESSMENT NOTE - COMFORT CARE
plan of care explained/side rails up/treatment delay explained
plan of care explained/side rails up
plan of care explained/po fluids offered
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
## 2024-04-06 NOTE — ED ADULT NURSE NOTE - IS THE PATIENT ABLE TO BE SCREENED?
Call to patient. She has a history of a hysterectomy and unilateral salpingectomy. She is experiencing night swears, hot flashes, insomnia, brain fog. She is interested in HRT. Discussed options in HRT. Patient is interested in an oral estradiol and topical testosterone for libido issues. Discussed with Dr. Asiya Nur. OK to start 0.5 mg estradiol and testosterone for libido. Patient aware and will call back in about 3 weeks with update.    Yes
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
## 2024-04-06 NOTE — ED PROVIDER NOTE - PROGRESS NOTE DETAILS
Radiology Dr. Alva called for significant post pharyngeal edema on CT and recommended ENT consult and MRI. +Spoke to ENT Paul STEELE for urgent consult. Pt's evaluated by ENT and recommended: CDU trasfer for observation, Pepcid, Decadron, Benadryl, and Ceftriaxone in CDU. No MRI required at this time. Radiology Dr. Alva called for significant post pharyngeal edema on CT and recommended ENT consult and MRI as needed. +Spoke to ENT Paul STEELE for urgent consult.

## 2024-04-06 NOTE — ED PROVIDER NOTE - PHYSICAL EXAMINATION
NAD. VSS. Afebrile. +PERRL, EOMI. +Right cheek mild swelling and tender. Normal tonsils without swelling, exudates, or PTA. Uvula midline. Neck supple. Lungs clear. ABD soft, non tender. No cva tender. Neuro- intact.

## 2024-04-06 NOTE — ED ADULT NURSE NOTE - CINV DISCH TEACH PARTICIP
When you send me refill response, I don't see what medicine was needed, and I can't refill it as it is not pended.  Can we route this back as a telephone encounter and pend the medicine needed with the right amount?     Patient

## 2024-04-06 NOTE — ED CDU PROVIDER DISPOSITION NOTE - NSFOLLOWUPINSTRUCTIONS_ED_ALL_ED_FT
Take steroids as prescribed  Take Antibiotics:  Follow up with ENT:  Reny Islas  Otolaryngology  [address] [place] [address], [address]  [place] [address]  Phone: [phone]  Fax: [phone]    Read the attached information about supraglottitis    If you develop trouble breathing, inability to swallow or manage your secretions, you must return immediately. Take steroids as prescribed   Take Antibiotics: for the next 10 days  Follow up with ENT:  Reny Islas  Otolaryngology  [address] [place] [address], [address]  [place] [address]  Phone: [phone]  Fax: [phone]    Read the attached information about supraglottitis    If you develop trouble breathing, inability to swallow or manage your secretions, you must return immediately.

## 2024-04-07 VITALS
RESPIRATION RATE: 18 BRPM | SYSTOLIC BLOOD PRESSURE: 145 MMHG | HEART RATE: 94 BPM | OXYGEN SATURATION: 98 % | TEMPERATURE: 98 F | DIASTOLIC BLOOD PRESSURE: 80 MMHG

## 2024-04-07 LAB
ALBUMIN SERPL ELPH-MCNC: 4.2 G/DL — SIGNIFICANT CHANGE UP (ref 3.3–5)
ALP SERPL-CCNC: 75 U/L — SIGNIFICANT CHANGE UP (ref 40–120)
ALT FLD-CCNC: 20 U/L — SIGNIFICANT CHANGE UP (ref 10–45)
ANION GAP SERPL CALC-SCNC: 12 MMOL/L — SIGNIFICANT CHANGE UP (ref 5–17)
AST SERPL-CCNC: 13 U/L — SIGNIFICANT CHANGE UP (ref 10–40)
BASOPHILS # BLD AUTO: 0.01 K/UL — SIGNIFICANT CHANGE UP (ref 0–0.2)
BASOPHILS NFR BLD AUTO: 0.1 % — SIGNIFICANT CHANGE UP (ref 0–2)
BILIRUB SERPL-MCNC: 0.7 MG/DL — SIGNIFICANT CHANGE UP (ref 0.2–1.2)
BUN SERPL-MCNC: 14 MG/DL — SIGNIFICANT CHANGE UP (ref 7–23)
CALCIUM SERPL-MCNC: 9.5 MG/DL — SIGNIFICANT CHANGE UP (ref 8.4–10.5)
CHLORIDE SERPL-SCNC: 107 MMOL/L — SIGNIFICANT CHANGE UP (ref 96–108)
CO2 SERPL-SCNC: 23 MMOL/L — SIGNIFICANT CHANGE UP (ref 22–31)
CREAT SERPL-MCNC: 0.99 MG/DL — SIGNIFICANT CHANGE UP (ref 0.5–1.3)
CULTURE RESULTS: SIGNIFICANT CHANGE UP
EGFR: 91 ML/MIN/1.73M2 — SIGNIFICANT CHANGE UP
EOSINOPHIL # BLD AUTO: 0 K/UL — SIGNIFICANT CHANGE UP (ref 0–0.5)
EOSINOPHIL NFR BLD AUTO: 0 % — SIGNIFICANT CHANGE UP (ref 0–6)
GLUCOSE SERPL-MCNC: 139 MG/DL — HIGH (ref 70–99)
HCT VFR BLD CALC: 42.7 % — SIGNIFICANT CHANGE UP (ref 39–50)
HGB BLD-MCNC: 14.7 G/DL — SIGNIFICANT CHANGE UP (ref 13–17)
IMM GRANULOCYTES NFR BLD AUTO: 0.2 % — SIGNIFICANT CHANGE UP (ref 0–0.9)
LYMPHOCYTES # BLD AUTO: 0.55 K/UL — LOW (ref 1–3.3)
LYMPHOCYTES # BLD AUTO: 6.8 % — LOW (ref 13–44)
MCHC RBC-ENTMCNC: 30.4 PG — SIGNIFICANT CHANGE UP (ref 27–34)
MCHC RBC-ENTMCNC: 34.4 GM/DL — SIGNIFICANT CHANGE UP (ref 32–36)
MCV RBC AUTO: 88.2 FL — SIGNIFICANT CHANGE UP (ref 80–100)
MONOCYTES # BLD AUTO: 0.16 K/UL — SIGNIFICANT CHANGE UP (ref 0–0.9)
MONOCYTES NFR BLD AUTO: 2 % — SIGNIFICANT CHANGE UP (ref 2–14)
NEUTROPHILS # BLD AUTO: 7.32 K/UL — SIGNIFICANT CHANGE UP (ref 1.8–7.4)
NEUTROPHILS NFR BLD AUTO: 90.9 % — HIGH (ref 43–77)
NRBC # BLD: 0 /100 WBCS — SIGNIFICANT CHANGE UP (ref 0–0)
PLATELET # BLD AUTO: 206 K/UL — SIGNIFICANT CHANGE UP (ref 150–400)
POTASSIUM SERPL-MCNC: 4.3 MMOL/L — SIGNIFICANT CHANGE UP (ref 3.5–5.3)
POTASSIUM SERPL-SCNC: 4.3 MMOL/L — SIGNIFICANT CHANGE UP (ref 3.5–5.3)
PROT SERPL-MCNC: 7.5 G/DL — SIGNIFICANT CHANGE UP (ref 6–8.3)
RBC # BLD: 4.84 M/UL — SIGNIFICANT CHANGE UP (ref 4.2–5.8)
RBC # FLD: 11.9 % — SIGNIFICANT CHANGE UP (ref 10.3–14.5)
SODIUM SERPL-SCNC: 142 MMOL/L — SIGNIFICANT CHANGE UP (ref 135–145)
SPECIMEN SOURCE: SIGNIFICANT CHANGE UP
WBC # BLD: 8.06 K/UL — SIGNIFICANT CHANGE UP (ref 3.8–10.5)
WBC # FLD AUTO: 8.06 K/UL — SIGNIFICANT CHANGE UP (ref 3.8–10.5)

## 2024-04-07 PROCEDURE — 85730 THROMBOPLASTIN TIME PARTIAL: CPT

## 2024-04-07 PROCEDURE — G0378: CPT

## 2024-04-07 PROCEDURE — 31505 DIAGNOSTIC LARYNGOSCOPY: CPT

## 2024-04-07 PROCEDURE — 70490 CT SOFT TISSUE NECK W/O DYE: CPT | Mod: MC

## 2024-04-07 PROCEDURE — 96376 TX/PRO/DX INJ SAME DRUG ADON: CPT | Mod: XU

## 2024-04-07 PROCEDURE — 36415 COLL VENOUS BLD VENIPUNCTURE: CPT

## 2024-04-07 PROCEDURE — 87081 CULTURE SCREEN ONLY: CPT

## 2024-04-07 PROCEDURE — 85610 PROTHROMBIN TIME: CPT

## 2024-04-07 PROCEDURE — 76377 3D RENDER W/INTRP POSTPROCES: CPT

## 2024-04-07 PROCEDURE — 99239 HOSP IP/OBS DSCHRG MGMT >30: CPT

## 2024-04-07 PROCEDURE — 87880 STREP A ASSAY W/OPTIC: CPT

## 2024-04-07 PROCEDURE — 99231 SBSQ HOSP IP/OBS SF/LOW 25: CPT | Mod: 25

## 2024-04-07 PROCEDURE — 96365 THER/PROPH/DIAG IV INF INIT: CPT | Mod: XU

## 2024-04-07 PROCEDURE — 85025 COMPLETE CBC W/AUTO DIFF WBC: CPT

## 2024-04-07 PROCEDURE — 87637 SARSCOV2&INF A&B&RSV AMP PRB: CPT

## 2024-04-07 PROCEDURE — 96375 TX/PRO/DX INJ NEW DRUG ADDON: CPT | Mod: XU

## 2024-04-07 PROCEDURE — 80053 COMPREHEN METABOLIC PANEL: CPT

## 2024-04-07 PROCEDURE — 99284 EMERGENCY DEPT VISIT MOD MDM: CPT | Mod: 25

## 2024-04-07 RX ORDER — PANTOPRAZOLE SODIUM 20 MG/1
1 TABLET, DELAYED RELEASE ORAL
Qty: 10 | Refills: 0
Start: 2024-04-07 | End: 2024-04-16

## 2024-04-07 RX ORDER — CEFUROXIME AXETIL 250 MG
1 TABLET ORAL
Qty: 20 | Refills: 0
Start: 2024-04-07 | End: 2024-04-16

## 2024-04-07 RX ORDER — CEFTRIAXONE 500 MG/1
1000 INJECTION, POWDER, FOR SOLUTION INTRAMUSCULAR; INTRAVENOUS ONCE
Refills: 0 | Status: COMPLETED | OUTPATIENT
Start: 2024-04-07 | End: 2024-04-07

## 2024-04-07 RX ADMIN — FAMOTIDINE 20 MILLIGRAM(S): 10 INJECTION INTRAVENOUS at 05:46

## 2024-04-07 RX ADMIN — Medication 15 MILLIGRAM(S): at 01:23

## 2024-04-07 RX ADMIN — Medication 15 MILLIGRAM(S): at 13:46

## 2024-04-07 RX ADMIN — Medication 15 MILLIGRAM(S): at 05:46

## 2024-04-07 RX ADMIN — CEFTRIAXONE 100 MILLIGRAM(S): 500 INJECTION, POWDER, FOR SOLUTION INTRAMUSCULAR; INTRAVENOUS at 18:51

## 2024-04-07 RX ADMIN — Medication 15 MILLIGRAM(S): at 16:13

## 2024-04-07 RX ADMIN — Medication 101.6 MILLIGRAM(S): at 05:46

## 2024-04-07 RX ADMIN — Medication 15 MILLIGRAM(S): at 19:42

## 2024-04-07 RX ADMIN — Medication 101.6 MILLIGRAM(S): at 16:41

## 2024-04-07 RX ADMIN — FAMOTIDINE 20 MILLIGRAM(S): 10 INJECTION INTRAVENOUS at 18:50

## 2024-04-07 NOTE — ED CDU PROVIDER SUBSEQUENT DAY NOTE - HISTORY
CDU PROGRESS NOTE CEDRIC ESPINOZA: No interval change. pt resting comfortably, airway patent, tolerating secretions, phonating well. NAD. VSS. will continue steroids, ENT reassessment in am

## 2024-04-07 NOTE — ED CDU PROVIDER SUBSEQUENT DAY NOTE - PROGRESS NOTE DETAILS
Pt resting comfortably. NAD. No complaints. VSS. pt feeling well. ENT wants the pt to stay for 2nd dose of ceftriaxone and more decadron. Improved swelling with re-scope from last night. diet advanced to soft. will continue to monitor. ROSSY Laura CDU PROGRESS NOTE CEDRIC OBRIEN: Received pt at 1900 sign-out. Case/plan reviewed. VSS. Pt resting in stretcher in NAD. Pt ate dinner. On exam, No Stridor / Drooling / Trismus.  Mucosa moist, tongue/uvula midline, oropharynx clear, S1 S2 noted, RRR, lungs CTA b/l, BS x4 with soft, nontender abdomen. Pt without complaints. Pt completed evening dose of Ceftriaxone and steroids. c/d/w ENT PA, cleared for d/c on Cefuroxime 250mg bid x 10 days, PPI daily x 10 days and Medrol dose pack. Plan for outpatient f/u with Dr. Islas in clinic. c/d/w Dr. Roberts for d/c.

## 2024-04-07 NOTE — ED ADULT NURSE REASSESSMENT NOTE - NS ED NURSE REASSESS COMMENT FT1
ENT at bedside for repeat scope
Pt received from NAVEEN Burk at 19:00hrs. Pt A&O x 4, speaks German, patient able to make his needs known in English. Pt in CDU for pain control, IV antibiotics, and IV decadron. Patient continue to have difficulty swallowing. Pt denies any chest pain, SOB, dizziness or palpitations. V/S stable, IV 20G Rt AC patent and free of signs of infiltration. Pt resting in bed. Safety & comfort measures maintained. Educated patient to call for assistance as needed. Call bell in reach. Will continue to monitor.
Pt received from NAVEEN Titus at 19:00hrs. Pt A&O x 4. Pt tolerated diet, dysphagia improved. Pt denies any chest pain, SOB, dizziness or palpitations. V/S stable, Patient awaiting to be discharged.
Received report from Janet Greene. pt is A&Ox4 able to follow all commands. Pulse, motor, sensation present and equal in all 4 extremities. pt Breathing spontaneous and unlabored on room air, Skin warm and dry and of color appropriate for ethnicity , moves all extremities, speech clear. pending ENT/dispo. no further nurse intervention needed at this time.
ED MD Britton made aware pt complaining of sudden dizziness and HA after administration of pepcid and benadryl. Decadron 8mb/ IVPB to be given. Pt has emesis bag he has been spitting in.
Pt made aware by ED NP Chey swelling in throat region. Blood work sent to lab. Pt well appearing. No relief in pain.
Received a 54M aaox4 ambulatory with h/o  HTN, HLD, CAD s/p stents (in 2023, on Plavix and ASA), NKDA, non smoker presents to ED with throat pain, hoarseness, and difficult swallowing/drinking for 2 days. Reports he felt throat pain with discomfort and swallowing problem since 2 days ago. Labs and CT scan was resulted, patient was accepted to CDU for observation. Patient felt better after medications. Stopped spitting and able to swallow liquids. VS WDL. Plan of care and unit routine explained to patient and verbalized understanding. RT AC 20g IV access noted and flushes well.

## 2024-04-07 NOTE — ED CDU PROVIDER SUBSEQUENT DAY NOTE - ATTENDING APP SHARED VISIT CONTRIBUTION OF CARE
Attending Lesley Herzog MD- I have personally performed a face to face diagnostic evaluation on this patient.  I have reviewed the ACP note and agree with the history, exam, and plan of care, except as noted. My exam and MDM are as follows:    I received signout on this patient at 0800–in short this is a 54 with multiple cardiovascular comorbidities presenting to ED for 2 days of throat pain, voice change, difficulty swallowing, ED workup concerning for significant postpharyngeal edema seen on CT.  Placed in CDU for ENT evaluation.  Overnight ENT scoped patient and nonobstructive supraglottitis, recommending IV antibiotics, pain control, Pepcid, IV Decadron Q8 with plan to rescoped in the morning.  On my assessment, patient endorsing significant improvement in symptoms and improvement in voice.  Airway patent, patient with normal work of breathing and speaking in full sentences.       ENT reevaluated patient, recommending reassessment in the evening after subsequent dose of IV antibiotics.  I signed out patient to Dr. Roberts pending final ENT recommendations.

## 2024-04-07 NOTE — PROGRESS NOTE ADULT - ASSESSMENT
53yo male pt with PMHx of HTN, HLD, CAD s/p stents (in 2023, on Plavix and ASA), NKDA, non smoker presents to ED with throat pain, hoarseness, and difficult swallowing/drinking for 2 days. Reports he felt throat pain with discomfort and swallowing problem since 2 days ago. He tried to vomit forcedly due to discomfort and took pain med without improvement, unsure of the name. Neck CT showed Focal soft tissue thickening with associated edema of the posterior pharyngeal soft tissues extending from the inferior oropharynx to the hypopharynx. Post allergic process vs neoplasm vs other inflammatory process. Bedside flexible laryngoscopy revealed B/L arytenoid, A/E fold, and post-cricoid edema and erythema consistent with non obstructive supraglottitis. Scope today showed improvement from yesterday's scope, airway widely patent. Afebrile. WBC 8.06. Recommend a soft diet.

## 2024-04-07 NOTE — PROGRESS NOTE ADULT - SUBJECTIVE AND OBJECTIVE BOX
ENT ISSUE/POD: Throat pain, dysphagia    HPI: 55yo male pt with PMHx of HTN, HLD, CAD s/p stents (in 2023, on Plavix and ASA), NKDA, non smoker presents to ED with throat pain, hoarseness, and difficult swallowing/drinking for 2 days. Reports he felt throat pain with discomfort and swallowing problem since 2 days ago. He tried to vomit forcedly due to discomfort and took pain med without improvement, unsure of the name. Non con neck CT showed soft tissue swelling in the posterior pharynx from inferior oropharynx to posterior hypopharynx. Bedside indirect laryngoscopy revealed erythema and edema of B/L A/E folds, arytenoids, and post-cricoid consistent with non obstructive supraglottitis. Pt reports improvement in his symptoms today and is tolerating his secretions. Denies fever, chills, or ear pain, SOB/ABD, N/V/D.           PAST MEDICAL & SURGICAL HISTORY:  HTN (hypertension), benign      HLD (hyperlipidemia)      H/O neck surgery        Allergies    No Known Allergies    Intolerances      MEDICATIONS  (STANDING):  dexAMETHasone  IVPB 8 milliGRAM(s) IV Intermittent every 8 hours  famotidine Injectable 20 milliGRAM(s) IV Push two times a day  ketorolac   Injectable 15 milliGRAM(s) IV Push every 6 hours    MEDICATIONS  (PRN):      Social History: see consult    Family history: see consult    ROS:   ENT: all negative except as noted in HPI   Pulm: denies SOB, cough, hemoptysis  Neuro: denies numbness/tingling, loss of sensation  Endo: denies heat/cold intolerance, excessive sweating      Vital Signs Last 24 Hrs  T(C): 36.5 (07 Apr 2024 12:00), Max: 37 (06 Apr 2024 17:04)  T(F): 97.7 (07 Apr 2024 12:00), Max: 98.6 (06 Apr 2024 17:04)  HR: 94 (07 Apr 2024 12:00) (75 - 94)  BP: 154/79 (07 Apr 2024 12:00) (109/68 - 154/79)  BP(mean): 91 (06 Apr 2024 17:04) (91 - 91)  RR: 18 (07 Apr 2024 12:00) (18 - 19)  SpO2: 99% (07 Apr 2024 12:00) (97% - 99%)    Parameters below as of 07 Apr 2024 12:00  Patient On (Oxygen Delivery Method): room air                              14.7   8.06  )-----------( 206      ( 07 Apr 2024 05:38 )             42.7    04-07    142  |  107  |  14  ----------------------------<  139<H>  4.3   |  23  |  0.99    Ca    9.5      07 Apr 2024 05:38    TPro  7.5  /  Alb  4.2  /  TBili  0.7  /  DBili  x   /  AST  13  /  ALT  20  /  AlkPhos  75  04-07   PT/INR - ( 06 Apr 2024 15:13 )   PT: 12.1 sec;   INR: 1.16 ratio         PTT - ( 06 Apr 2024 15:13 )  PTT:32.4 sec    PHYSICAL EXAM:  Gen: NAD  Skin: No rashes, bruises, or lesions  Head: Normocephalic, Atraumatic  Face: Mild right facial tenderness resolved, no significant edema, no erythema, or fluctuance. Parotid glands soft without mass  Eyes: no scleral injection  Nose: Nares bilaterally patent, no discharge  Mouth: No Stridor / Drooling / Trismus.  Mucosa moist, tongue/uvula midline, oropharynx clear  Neck: Flat, supple, no lymphadenopathy, trachea midline, no masses  Lymphatic: No lymphadenopathy  Resp: breathing easily, no stridor  CV: no peripheral edema/cyanosis  GI: nondistended   Peripheral vascular: no JVD or edema  Neuro: facial nerve intact, no facial droop      Fiberoptic Indirect laryngoscopy:  (Scope #2 used)    Reason for Laryngoscopy:    Patient was unable to cooperate with mirror.  B/L arytenoid, A/E fold, and post-cricoid edema and erythema decreased. Nasopharynx, oropharynx clear, no bleeding. Tongue base, posterior pharyngeal wall, vallecula, epiglottis, and subglottis appear normal. No pooling of secretions, masses or lesions. Airway patent, no foreign body visualized. No glottic edema. True vocal cords, vestibular folds, ventricles, pyriform sinuses appear normal bilaterally. Vocal cords mobile with good contact b/l.

## 2024-04-15 ENCOUNTER — APPOINTMENT (OUTPATIENT)
Dept: INTERNAL MEDICINE | Facility: CLINIC | Age: 54
End: 2024-04-15

## 2024-06-12 ENCOUNTER — APPOINTMENT (OUTPATIENT)
Dept: CARDIOLOGY | Facility: HOSPITAL | Age: 54
End: 2024-06-12

## 2024-06-12 ENCOUNTER — OUTPATIENT (OUTPATIENT)
Dept: OUTPATIENT SERVICES | Facility: HOSPITAL | Age: 54
LOS: 1 days | End: 2024-06-12
Payer: SELF-PAY

## 2024-06-12 ENCOUNTER — NON-APPOINTMENT (OUTPATIENT)
Age: 54
End: 2024-06-12

## 2024-06-12 VITALS — DIASTOLIC BLOOD PRESSURE: 77 MMHG | SYSTOLIC BLOOD PRESSURE: 128 MMHG

## 2024-06-12 VITALS
OXYGEN SATURATION: 100 % | WEIGHT: 245 LBS | HEART RATE: 71 BPM | BODY MASS INDEX: 35.07 KG/M2 | SYSTOLIC BLOOD PRESSURE: 142 MMHG | DIASTOLIC BLOOD PRESSURE: 82 MMHG | HEIGHT: 70 IN

## 2024-06-12 DIAGNOSIS — Z98.890 OTHER SPECIFIED POSTPROCEDURAL STATES: Chronic | ICD-10-CM

## 2024-06-12 DIAGNOSIS — I25.10 ATHEROSCLEROTIC HEART DISEASE OF NATIVE CORONARY ARTERY WITHOUT ANGINA PECTORIS: ICD-10-CM

## 2024-06-12 PROCEDURE — G0463: CPT

## 2024-06-12 PROCEDURE — 93005 ELECTROCARDIOGRAM TRACING: CPT

## 2024-06-12 RX ORDER — METOPROLOL SUCCINATE 50 MG/1
50 TABLET, EXTENDED RELEASE ORAL
Qty: 90 | Refills: 1 | Status: ACTIVE | COMMUNITY
Start: 2022-10-12 | End: 1900-01-01

## 2024-06-12 RX ORDER — ATORVASTATIN CALCIUM 40 MG/1
40 TABLET, FILM COATED ORAL DAILY
Qty: 180 | Refills: 1 | Status: ACTIVE | COMMUNITY
Start: 2022-11-07 | End: 1900-01-01

## 2024-06-12 RX ORDER — ASPIRIN ENTERIC COATED TABLETS 81 MG 81 MG/1
81 TABLET, DELAYED RELEASE ORAL DAILY
Qty: 90 | Refills: 1 | Status: ACTIVE | COMMUNITY
Start: 2022-10-12 | End: 1900-01-01

## 2024-06-12 RX ORDER — VALSARTAN 40 MG/1
40 TABLET, COATED ORAL DAILY
Qty: 90 | Refills: 1 | Status: ACTIVE | COMMUNITY
Start: 2022-11-07 | End: 1900-01-01

## 2024-06-12 NOTE — DISCUSSION/SUMMARY
[FreeTextEntry1] : 55 yo M with hx of AWSTEMI in 10/2022 s/p 1 LE to pLAD, recent chest pain s/p LHC on 11/2023 with mild CAD and patent LAD stent, TTE with normal EF, is here for follow up.  #CAD - TTE from 10/2023 with normal EF, diastolic function, RV function - most recent LHC in 11/2023 with mild CAD - c/w asa 81 daily - c/w atorvastatin 80 daily - c/w toprol 50 daily - c/w valsartan 40 daily - check CBC, CMP, lipids, and a1c today  F/u in 6 months

## 2024-06-12 NOTE — HISTORY OF PRESENT ILLNESS
[FreeTextEntry1] : 55 yo M with hx of AWSTEMI in 10/2022 s/p LHC found pLAD 99%, D1 60%, D2 70% s/p LE x1 to pLAD. S/p short CICU stay. TTE showed EF 51% with no WMA and mild MR. Most recent LDL was 71 on 8/2023.  Patient had some exertional chest pain a few months ago and given hx and risk factors, we sent him for LHC in 11/2023 that showed mild CAD with no flow limiting lesions and patent LAD stent. Today patient said his CP is no longer occurring and he has no other symptoms. Repeat TTE in 10/2023 was normal.  He feels well today, denies CP, SOB, palpitations, syncope, or edema. He had some hemoptysis recently and was diagnosed with supraglottitis with ENT, now resolved.

## 2024-07-15 ENCOUNTER — OUTPATIENT (OUTPATIENT)
Dept: OUTPATIENT SERVICES | Facility: HOSPITAL | Age: 54
LOS: 1 days | Discharge: HOME | End: 2024-07-15

## 2024-07-15 ENCOUNTER — APPOINTMENT (OUTPATIENT)
Dept: OTOLARYNGOLOGY | Facility: CLINIC | Age: 54
End: 2024-07-15

## 2024-07-15 VITALS
HEIGHT: 70 IN | HEART RATE: 81 BPM | DIASTOLIC BLOOD PRESSURE: 83 MMHG | BODY MASS INDEX: 35.07 KG/M2 | WEIGHT: 245 LBS | SYSTOLIC BLOOD PRESSURE: 144 MMHG

## 2024-07-15 DIAGNOSIS — Z98.890 OTHER SPECIFIED POSTPROCEDURAL STATES: Chronic | ICD-10-CM

## 2024-07-15 DIAGNOSIS — R07.0 PAIN IN THROAT: ICD-10-CM

## 2024-07-15 PROCEDURE — 99203 OFFICE O/P NEW LOW 30 MIN: CPT | Mod: 25

## 2024-07-15 PROCEDURE — 31575 DIAGNOSTIC LARYNGOSCOPY: CPT

## 2024-07-25 DIAGNOSIS — R07.0 PAIN IN THROAT: ICD-10-CM

## 2025-01-15 ENCOUNTER — OUTPATIENT (OUTPATIENT)
Dept: OUTPATIENT SERVICES | Facility: HOSPITAL | Age: 55
LOS: 1 days | End: 2025-01-15
Payer: SELF-PAY

## 2025-01-15 ENCOUNTER — APPOINTMENT (OUTPATIENT)
Dept: CARDIOLOGY | Facility: HOSPITAL | Age: 55
End: 2025-01-15

## 2025-01-15 ENCOUNTER — NON-APPOINTMENT (OUTPATIENT)
Age: 55
End: 2025-01-15

## 2025-01-15 VITALS
DIASTOLIC BLOOD PRESSURE: 80 MMHG | HEART RATE: 68 BPM | SYSTOLIC BLOOD PRESSURE: 132 MMHG | WEIGHT: 245 LBS | OXYGEN SATURATION: 96 % | BODY MASS INDEX: 35.15 KG/M2

## 2025-01-15 DIAGNOSIS — I25.10 ATHEROSCLEROTIC HEART DISEASE OF NATIVE CORONARY ARTERY W/OUT ANGINA PECTORIS: ICD-10-CM

## 2025-01-15 DIAGNOSIS — Z98.890 OTHER SPECIFIED POSTPROCEDURAL STATES: Chronic | ICD-10-CM

## 2025-01-15 DIAGNOSIS — E78.5 HYPERLIPIDEMIA, UNSPECIFIED: ICD-10-CM

## 2025-01-15 DIAGNOSIS — I25.10 ATHEROSCLEROTIC HEART DISEASE OF NATIVE CORONARY ARTERY WITHOUT ANGINA PECTORIS: ICD-10-CM

## 2025-01-15 PROCEDURE — 93005 ELECTROCARDIOGRAM TRACING: CPT

## 2025-01-15 PROCEDURE — G0463: CPT

## 2025-01-20 PROBLEM — E78.5 DYSLIPIDEMIA: Status: ACTIVE | Noted: 2025-01-20

## 2025-01-20 PROBLEM — I25.10 CAD (CORONARY ARTERY DISEASE): Status: ACTIVE | Noted: 2025-01-20

## 2025-01-21 DIAGNOSIS — E78.5 HYPERLIPIDEMIA, UNSPECIFIED: ICD-10-CM

## 2025-02-28 ENCOUNTER — APPOINTMENT (OUTPATIENT)
Dept: INTERNAL MEDICINE | Facility: CLINIC | Age: 55
End: 2025-02-28
Payer: COMMERCIAL

## 2025-02-28 ENCOUNTER — INPATIENT (INPATIENT)
Facility: HOSPITAL | Age: 55
LOS: 2 days | Discharge: HOME | DRG: 287 | End: 2025-03-03
Attending: INTERNAL MEDICINE | Admitting: STUDENT IN AN ORGANIZED HEALTH CARE EDUCATION/TRAINING PROGRAM
Payer: MEDICAID

## 2025-02-28 ENCOUNTER — OUTPATIENT (OUTPATIENT)
Dept: OUTPATIENT SERVICES | Facility: HOSPITAL | Age: 55
LOS: 1 days | End: 2025-02-28
Payer: SELF-PAY

## 2025-02-28 ENCOUNTER — NON-APPOINTMENT (OUTPATIENT)
Age: 55
End: 2025-02-28

## 2025-02-28 VITALS
HEIGHT: 70 IN | SYSTOLIC BLOOD PRESSURE: 172 MMHG | DIASTOLIC BLOOD PRESSURE: 79 MMHG | HEART RATE: 62 BPM | WEIGHT: 250 LBS | TEMPERATURE: 98 F | OXYGEN SATURATION: 98 % | RESPIRATION RATE: 18 BRPM

## 2025-02-28 VITALS
BODY MASS INDEX: 35.79 KG/M2 | HEART RATE: 69 BPM | DIASTOLIC BLOOD PRESSURE: 80 MMHG | OXYGEN SATURATION: 99 % | WEIGHT: 250 LBS | SYSTOLIC BLOOD PRESSURE: 120 MMHG | HEIGHT: 70 IN

## 2025-02-28 DIAGNOSIS — R07.9 CHEST PAIN, UNSPECIFIED: ICD-10-CM

## 2025-02-28 DIAGNOSIS — I10 ESSENTIAL (PRIMARY) HYPERTENSION: ICD-10-CM

## 2025-02-28 DIAGNOSIS — Z00.00 ENCOUNTER FOR GENERAL ADULT MEDICAL EXAMINATION W/OUT ABNORMAL FINDINGS: ICD-10-CM

## 2025-02-28 DIAGNOSIS — Z98.890 OTHER SPECIFIED POSTPROCEDURAL STATES: Chronic | ICD-10-CM

## 2025-02-28 LAB
ALBUMIN SERPL ELPH-MCNC: 4.4 G/DL — SIGNIFICANT CHANGE UP (ref 3.3–5)
ALP SERPL-CCNC: 99 U/L — SIGNIFICANT CHANGE UP (ref 40–120)
ALT FLD-CCNC: 25 U/L — SIGNIFICANT CHANGE UP (ref 10–45)
ANION GAP SERPL CALC-SCNC: 12 MMOL/L — SIGNIFICANT CHANGE UP (ref 5–17)
APTT BLD: 35.8 SEC — HIGH (ref 24.5–35.6)
AST SERPL-CCNC: 18 U/L — SIGNIFICANT CHANGE UP (ref 10–40)
BASOPHILS # BLD AUTO: 0.05 K/UL — SIGNIFICANT CHANGE UP (ref 0–0.2)
BASOPHILS NFR BLD AUTO: 0.7 % — SIGNIFICANT CHANGE UP (ref 0–2)
BILIRUB SERPL-MCNC: 0.4 MG/DL — SIGNIFICANT CHANGE UP (ref 0.2–1.2)
BUN SERPL-MCNC: 15 MG/DL — SIGNIFICANT CHANGE UP (ref 7–23)
CALCIUM SERPL-MCNC: 9.4 MG/DL — SIGNIFICANT CHANGE UP (ref 8.4–10.5)
CHLORIDE SERPL-SCNC: 102 MMOL/L — SIGNIFICANT CHANGE UP (ref 96–108)
CO2 SERPL-SCNC: 25 MMOL/L — SIGNIFICANT CHANGE UP (ref 22–31)
CREAT SERPL-MCNC: 1.02 MG/DL — SIGNIFICANT CHANGE UP (ref 0.5–1.3)
EGFR: 87 ML/MIN/1.73M2 — SIGNIFICANT CHANGE UP
EGFR: 87 ML/MIN/1.73M2 — SIGNIFICANT CHANGE UP
EOSINOPHIL # BLD AUTO: 0.2 K/UL — SIGNIFICANT CHANGE UP (ref 0–0.5)
EOSINOPHIL NFR BLD AUTO: 2.7 % — SIGNIFICANT CHANGE UP (ref 0–6)
GAS PNL BLDV: SIGNIFICANT CHANGE UP
GLUCOSE SERPL-MCNC: 136 MG/DL — HIGH (ref 70–99)
HCT VFR BLD CALC: 43.5 % — SIGNIFICANT CHANGE UP (ref 39–50)
HGB BLD-MCNC: 14.8 G/DL — SIGNIFICANT CHANGE UP (ref 13–17)
IMM GRANULOCYTES NFR BLD AUTO: 0.4 % — SIGNIFICANT CHANGE UP (ref 0–0.9)
INR BLD: 0.98 RATIO — SIGNIFICANT CHANGE UP (ref 0.85–1.16)
LYMPHOCYTES # BLD AUTO: 1.42 K/UL — SIGNIFICANT CHANGE UP (ref 1–3.3)
LYMPHOCYTES # BLD AUTO: 19.5 % — SIGNIFICANT CHANGE UP (ref 13–44)
MCHC RBC-ENTMCNC: 29.5 PG — SIGNIFICANT CHANGE UP (ref 27–34)
MCHC RBC-ENTMCNC: 34 G/DL — SIGNIFICANT CHANGE UP (ref 32–36)
MCV RBC AUTO: 86.8 FL — SIGNIFICANT CHANGE UP (ref 80–100)
MONOCYTES # BLD AUTO: 0.59 K/UL — SIGNIFICANT CHANGE UP (ref 0–0.9)
MONOCYTES NFR BLD AUTO: 8.1 % — SIGNIFICANT CHANGE UP (ref 2–14)
NEUTROPHILS # BLD AUTO: 4.99 K/UL — SIGNIFICANT CHANGE UP (ref 1.8–7.4)
NEUTROPHILS NFR BLD AUTO: 68.6 % — SIGNIFICANT CHANGE UP (ref 43–77)
NRBC BLD AUTO-RTO: 0 /100 WBCS — SIGNIFICANT CHANGE UP (ref 0–0)
PLATELET # BLD AUTO: 204 K/UL — SIGNIFICANT CHANGE UP (ref 150–400)
POTASSIUM SERPL-MCNC: 4.2 MMOL/L — SIGNIFICANT CHANGE UP (ref 3.5–5.3)
POTASSIUM SERPL-SCNC: 4.2 MMOL/L — SIGNIFICANT CHANGE UP (ref 3.5–5.3)
PROT SERPL-MCNC: 7.4 G/DL — SIGNIFICANT CHANGE UP (ref 6–8.3)
PROTHROM AB SERPL-ACNC: 11.2 SEC — SIGNIFICANT CHANGE UP (ref 9.9–13.4)
RBC # BLD: 5.01 M/UL — SIGNIFICANT CHANGE UP (ref 4.2–5.8)
RBC # FLD: 12.1 % — SIGNIFICANT CHANGE UP (ref 10.3–14.5)
SODIUM SERPL-SCNC: 139 MMOL/L — SIGNIFICANT CHANGE UP (ref 135–145)
TROPONIN T, HIGH SENSITIVITY RESULT: 10 NG/L — SIGNIFICANT CHANGE UP (ref 0–51)
TROPONIN T, HIGH SENSITIVITY RESULT: 9 NG/L — SIGNIFICANT CHANGE UP (ref 0–51)
WBC # BLD: 7.28 K/UL — SIGNIFICANT CHANGE UP (ref 3.8–10.5)
WBC # FLD AUTO: 7.28 K/UL — SIGNIFICANT CHANGE UP (ref 3.8–10.5)

## 2025-02-28 PROCEDURE — 71046 X-RAY EXAM CHEST 2 VIEWS: CPT | Mod: 26

## 2025-02-28 PROCEDURE — T1013: CPT

## 2025-02-28 PROCEDURE — 99223 1ST HOSP IP/OBS HIGH 75: CPT

## 2025-02-28 PROCEDURE — G0463: CPT

## 2025-02-28 PROCEDURE — 99215 OFFICE O/P EST HI 40 MIN: CPT | Mod: GC

## 2025-02-28 PROCEDURE — 99285 EMERGENCY DEPT VISIT HI MDM: CPT

## 2025-02-28 PROCEDURE — G2211 COMPLEX E/M VISIT ADD ON: CPT

## 2025-02-28 RX ORDER — INFLUENZA A VIRUS A/IDAHO/07/2018 (H1N1) ANTIGEN (MDCK CELL DERIVED, PROPIOLACTONE INACTIVATED, INFLUENZA A VIRUS A/INDIANA/08/2018 (H3N2) ANTIGEN (MDCK CELL DERIVED, PROPIOLACTONE INACTIVATED), INFLUENZA B VIRUS B/SINGAPORE/INFTT-16-0610/2016 ANTIGEN (MDCK CELL DERIVED, PROPIOLACTONE INACTIVATED), INFLUENZA B VIRUS B/IOWA/06/2017 ANTIGEN (MDCK CELL DERIVED, PROPIOLACTONE INACTIVATED) 15; 15; 15; 15 UG/.5ML; UG/.5ML; UG/.5ML; UG/.5ML
0.5 INJECTION, SUSPENSION INTRAMUSCULAR ONCE
Refills: 0 | Status: DISCONTINUED | OUTPATIENT
Start: 2025-02-28 | End: 2025-03-03

## 2025-02-28 RX ORDER — METOPROLOL SUCCINATE 50 MG/1
50 TABLET, EXTENDED RELEASE ORAL DAILY
Refills: 0 | Status: DISCONTINUED | OUTPATIENT
Start: 2025-02-28 | End: 2025-03-03

## 2025-02-28 RX ORDER — ATORVASTATIN CALCIUM 80 MG/1
40 TABLET, FILM COATED ORAL AT BEDTIME
Refills: 0 | Status: DISCONTINUED | OUTPATIENT
Start: 2025-02-28 | End: 2025-03-03

## 2025-02-28 RX ORDER — ASPIRIN 325 MG
162 TABLET ORAL ONCE
Refills: 0 | Status: COMPLETED | OUTPATIENT
Start: 2025-02-28 | End: 2025-02-28

## 2025-02-28 RX ORDER — ASPIRIN 325 MG
81 TABLET ORAL DAILY
Refills: 0 | Status: DISCONTINUED | OUTPATIENT
Start: 2025-03-01 | End: 2025-03-03

## 2025-02-28 RX ADMIN — ATORVASTATIN CALCIUM 40 MILLIGRAM(S): 80 TABLET, FILM COATED ORAL at 21:17

## 2025-02-28 RX ADMIN — Medication 162 MILLIGRAM(S): at 12:00

## 2025-02-28 NOTE — ED PROVIDER NOTE - CLINICAL SUMMARY MEDICAL DECISION MAKING FREE TEXT BOX
55-year-old male past medical history of CAD, diabetes presents to the ED from clinic for chest pain x 2 weeks.  Patient states pain has been intermittent waxing and waning.  Pain localized to the left chest with no radiation to the neck or extremities.  Patient denies any diaphoresis, dizziness, lightheadedness, nausea or vomiting.  He has not been taking anything for pain control.  He had a checkup today and due to the duration of symptoms he was referred to the emergency room.  At this time patient states the pain is a 3 out of 10.  No fevers, nausea, vomiting, abdominal pain, back pain.  Differential diagnosis include but not limited to ACS versus MSK .Check labs, cardiac biomarkers,, EKG, CXR, place on cardiac monitor for telemetry monitoring.

## 2025-02-28 NOTE — ED PROVIDER NOTE - WR ORDER STATUS 1
Performed Resulted Rifampin Pregnancy And Lactation Text: This medication is Pregnancy Category C and it isn't know if it is safe during pregnancy. It is also excreted in breast milk and should not be used if you are breast feeding.

## 2025-02-28 NOTE — ED ADULT NURSE NOTE - OBJECTIVE STATEMENT
55Y M AXO3 PMH of CAD s/p stent and prediabetes presented to the ED via EMS from PCP office c/o intermittent non-radiating mid-sternal chest pain x 2 weeks. EMS reports pt went to annual check up from PCP and was advised to seek care at ED. Upon arrival to the ED, the pt is well appearing, has bilateral even and unlabored chest rise, and airway is patent. Upon assessment, pt has even and bilateral peripheral pulses, ROM, PERRLA, gross neuro intact, and soft, non-tender, non-distended abdomen. Pt denies fevers, chills, n/v/d, numbness or tingling of extremities, headache, dizziness, SOB, urinary symptoms, and black or bloody stools. Comfort and safety provided, bed in lowest position, side rails up,

## 2025-02-28 NOTE — H&P ADULT - NSHPLABSRESULTS_GEN_ALL_CORE
LABS:                      14.8   7.28  )-----------( 204      ( 28 Feb 2025 12:11 )             43.5     02-28    139  |  102  |  15  ----------------------------<  136[H]  4.2   |  25  |  1.02    Ca    9.4      28 Feb 2025 12:11    TPro  7.4  /  Alb  4.4  /  TBili  0.4  /  DBili  x   /  AST  18  /  ALT  25  /  AlkPhos  99  02-28    LIVER FUNCTIONS - ( 28 Feb 2025 12:11 )  Alb: 4.4 g/dL / Pro: 7.4 g/dL / ALK PHOS: 99 U/L / ALT: 25 U/L / AST: 18 U/L / GGT: x           PT/INR - ( 28 Feb 2025 12:11 )   PT: 11.2 sec;   INR: 0.98 ratio    PTT - ( 28 Feb 2025 12:11 )  PTT:35.8 sec    Urinalysis Basic - ( 28 Feb 2025 12:11 )  Color: x / Appearance: x / SG: x / pH: x  Gluc: 136 mg/dL / Ketone: x  / Bili: x / Urobili: x   Blood: x / Protein: x / Nitrite: x   Leuk Esterase: x / RBC: x / WBC x   Sq Epi: x / Non Sq Epi: x / Bacteria: x    IMAGING:  Xray Chest 2 Views PA/Lat (02.28.25 @ 12:22)  IMPRESSION:  - Clear lungs.

## 2025-02-28 NOTE — H&P ADULT - NSICDXPASTMEDICALHX_GEN_ALL_CORE_FT
PAST MEDICAL HISTORY:  HLD (hyperlipidemia)     HTN (hypertension), benign     STEMI (ST elevation myocardial infarction)

## 2025-02-28 NOTE — H&P ADULT - NSHPPHYSICALEXAM_GEN_ALL_CORE
Vital Signs Last 24 Hrs  T(C): 36.4 (28 Feb 2025 19:56), Max: 36.7 (28 Feb 2025 12:28)  T(F): 97.6 (28 Feb 2025 19:56), Max: 98.1 (28 Feb 2025 12:28)  HR: 66 (28 Feb 2025 19:56) (59 - 66)  BP: 134/79 (28 Feb 2025 19:56) (120/75 - 172/79)  RR: 18 (28 Feb 2025 19:56) (16 - 18)  SpO2: 98% (28 Feb 2025 19:56) (96% - 98%)    Parameters below as of 28 Feb 2025 19:56  Patient On (Oxygen Delivery Method): room air    CONSTITUTIONAL: Well-groomed, in no apparent distress  EYES: No conjunctival or scleral injection, non-icteric;   ENMT: No external nasal lesions; MMM  NECK: Trachea midline without palpable neck mass; thyroid not enlarged and non-tender  RESPIRATORY: Breathing comfortably; no dullness to percussion; lungs CTA without wheeze/rhonchi/rales  CARDIOVASCULAR: +S1S2, RRR, no M/G/R; pedal pulses full and symmetric; no lower extremity edema  GASTROINTESTINAL: No palpable masses or tenderness, +BS throughout, no rebound/guarding; no hepatosplenomegaly; no hernia palpated  LYMPHATIC: No cervical LAD or tenderness  SKIN: No rashes or ulcers noted  NEUROLOGIC: CN II-XII intact; sensation intact in LEs b/l to light touch  PSYCHIATRIC: A&Ox3; mood and affect appropriate; appropriate insight and judgment

## 2025-02-28 NOTE — ED PROVIDER NOTE - OBJECTIVE STATEMENT
55-year-old male past medical history of CAD, diabetes presents to the ED from clinic for chest pain x 2 weeks.  Patient states pain has been intermittent waxing and waning.  Pain localized to the left chest with no radiation to the neck or extremities.  Patient denies any diaphoresis, dizziness, lightheadedness, nausea or vomiting.  He has not been taking anything for pain control.  He had a checkup today and due to the duration of symptoms he was referred to the emergency room.  At this time patient states the pain is a 3 out of 10.  No fevers, nausea, vomiting, abdominal pain, back pain.

## 2025-02-28 NOTE — PATIENT PROFILE ADULT - FALL HARM RISK - RISK INTERVENTIONS

## 2025-02-28 NOTE — ED CLERICAL - NS ED CLERK NOTE PRE-ARRIVAL INFORMATION; ADDITIONAL PRE-ARRIVAL INFORMATION
CC/Reason For referral: chest pain for 2 weeks. stemi in 2022  Preferred Consultant(if applicable):  Who admits for you (if needed):  Do you have documents you would like to fax over? no  Would you still like to speak to an ED attending? no

## 2025-02-28 NOTE — ED PROVIDER NOTE - ATTENDING CONTRIBUTION TO CARE
Shameka Watts [facility], [facility]  55-year-old male [facility] AWSTEMI 10/22 status post stent to LAD, hyperlipidemia, HTN patient subsequently underwent cath 11/23 after positive stress test results showing mild CAD with no flow-limiting lesions LAD stent was patent at that time.  DW Dr Olmedo 865 clinic physician believes patient has been noncompliant with his medications.  Today he presented for annual physical has been complaining of 2 weeks worth of intermittent chest pain 5 out of 10, sometimes worse with exertion sometimes worse with position.  Currently pain-free.  Physical exam adult male awake alert GCS 15 speech fluent  HEENT normocephalic/atraumatic.  Chest clear to A&P.  CV no rubs gallops or murmur.  Abdomen soft positive bowel sounds neuro no focal defects.  Froilan Farris MD, Facep

## 2025-02-28 NOTE — ED ADULT NURSE NOTE - NSFALLUNIVINTERV_ED_ALL_ED
Bed/Stretcher in lowest position, wheels locked, appropriate side rails in place/Call bell, personal items and telephone in reach/Instruct patient to call for assistance before getting out of bed/chair/stretcher/Non-slip footwear applied when patient is off stretcher/[place] to call system/Physically safe environment - no spills, clutter or unnecessary equipment/Purposeful proactive rounding/Room/bathroom lighting operational, light cord in reach

## 2025-02-28 NOTE — PATIENT PROFILE ADULT - PATIENT’S MOTHER’S MAIDEN LAST NAME (INFO USED BY THE IMMUNIZATION REGISTRY):
[facility] Approved    Filling Pharmacy: [facility]    
[facility] Pending    Insurance response  Prescription Drug Insurance: [employer]  Notes: Prior authorization submitted - will update provider when decision has been made by insurance.       
LAYNE

## 2025-02-28 NOTE — ED PROVIDER NOTE - PHYSICAL EXAMINATION
Physical Exam:  Gen:  Well appearing, awake, alert, non-toxic appearing  Head: NCAT  HEENT: Normal conjunctiva, trachea midline, moist mucous membranes  Lung:  no respiratory distress, speaking in full sentences  CV: RRR, no murmurs, rubs or gallops  Abd: Soft, non-tender, non-distended,   MSK: No visible deformities  Neuro: No focal motor deficits  Skin: Warm, well perfused, no visible rashes, no leg swelling  Psych: appropriate affect and mood

## 2025-02-28 NOTE — H&P ADULT - PROBLEM SELECTOR PLAN 1
- ECG non-ischemic w/ trops negative  - Monitor on tele  - f/u TTE to r/o WMA   - NST to further assess  - c/w ASA 81mg qd and Plavix 75mg qd   - c/w Toprol 50mg qd  - c/w Atorvastatin 40mg qhs  - c/w Valsartan 40mg qd  - If CP ON, STAT ECG and cardiac enzymes   - Cards following, recs appreciated

## 2025-02-28 NOTE — CONSULT NOTE ADULT - ASSESSMENT
55M with PMH of HTN, HLD, T2DM, hx of STEMI in 2022, s/p PCI to LAD, c/b cardiogenic shock and CCU stay. TTE w/ largely preserved EF and no significant valvular heart disease. He is here for intermittent cp x 1-2 weeks.     1. CAD, hx of STEMI in 2022 s/p PCI to LAD   -cont asa and plavix   -cont toprol XL 50 mg qd   -cont atorva 40 mg qd   -cont valsartan 40 mg qd     2. Intermittent cp, hx of CAD, consistent with stable angina   -trop negative   -no sig STT changes   -no tachycardiac or hypoxic, less likely to be PE. would check a DDimer level   -recommend inpt NST and TTE ECHO for CAD ady Bowden MD [facility]  Attending Interventional Cardiologist, [facility]-NS/CLAUDIA.   Avaliable on [employer] Team

## 2025-03-01 DIAGNOSIS — Z29.9 ENCOUNTER FOR PROPHYLACTIC MEASURES, UNSPECIFIED: ICD-10-CM

## 2025-03-01 DIAGNOSIS — E11.9 TYPE 2 DIABETES MELLITUS WITHOUT COMPLICATIONS: ICD-10-CM

## 2025-03-01 DIAGNOSIS — E78.5 HYPERLIPIDEMIA, UNSPECIFIED: ICD-10-CM

## 2025-03-01 DIAGNOSIS — I10 ESSENTIAL (PRIMARY) HYPERTENSION: ICD-10-CM

## 2025-03-01 LAB
A1C WITH ESTIMATED AVERAGE GLUCOSE RESULT: 6.7 % — HIGH (ref 4–5.6)
ANION GAP SERPL CALC-SCNC: 14 MMOL/L — SIGNIFICANT CHANGE UP (ref 5–17)
APTT BLD: 33.4 SEC — SIGNIFICANT CHANGE UP (ref 24.5–35.6)
BASOPHILS # BLD AUTO: 0.05 K/UL — SIGNIFICANT CHANGE UP (ref 0–0.2)
BASOPHILS NFR BLD AUTO: 0.7 % — SIGNIFICANT CHANGE UP (ref 0–2)
BUN SERPL-MCNC: 16 MG/DL — SIGNIFICANT CHANGE UP (ref 7–23)
CALCIUM SERPL-MCNC: 9.3 MG/DL — SIGNIFICANT CHANGE UP (ref 8.4–10.5)
CHLORIDE SERPL-SCNC: 106 MMOL/L — SIGNIFICANT CHANGE UP (ref 96–108)
CO2 SERPL-SCNC: 22 MMOL/L — SIGNIFICANT CHANGE UP (ref 22–31)
CREAT SERPL-MCNC: 1 MG/DL — SIGNIFICANT CHANGE UP (ref 0.5–1.3)
D DIMER BLD IA.RAPID-MCNC: <150 NG/ML DDU — SIGNIFICANT CHANGE UP
EGFR: 89 ML/MIN/1.73M2 — SIGNIFICANT CHANGE UP
EGFR: 89 ML/MIN/1.73M2 — SIGNIFICANT CHANGE UP
EOSINOPHIL # BLD AUTO: 0.19 K/UL — SIGNIFICANT CHANGE UP (ref 0–0.5)
EOSINOPHIL NFR BLD AUTO: 2.6 % — SIGNIFICANT CHANGE UP (ref 0–6)
ESTIMATED AVERAGE GLUCOSE: 146 MG/DL — HIGH (ref 68–114)
GLUCOSE BLDC GLUCOMTR-MCNC: 111 MG/DL — HIGH (ref 70–99)
GLUCOSE BLDC GLUCOMTR-MCNC: 126 MG/DL — HIGH (ref 70–99)
GLUCOSE BLDC GLUCOMTR-MCNC: 136 MG/DL — HIGH (ref 70–99)
GLUCOSE BLDC GLUCOMTR-MCNC: 150 MG/DL — HIGH (ref 70–99)
GLUCOSE SERPL-MCNC: 109 MG/DL — HIGH (ref 70–99)
HCT VFR BLD CALC: 42.4 % — SIGNIFICANT CHANGE UP (ref 39–50)
HGB BLD-MCNC: 14.7 G/DL — SIGNIFICANT CHANGE UP (ref 13–17)
IMM GRANULOCYTES NFR BLD AUTO: 0.3 % — SIGNIFICANT CHANGE UP (ref 0–0.9)
INR BLD: 1 RATIO — SIGNIFICANT CHANGE UP (ref 0.85–1.16)
LYMPHOCYTES # BLD AUTO: 1.52 K/UL — SIGNIFICANT CHANGE UP (ref 1–3.3)
LYMPHOCYTES # BLD AUTO: 20.9 % — SIGNIFICANT CHANGE UP (ref 13–44)
MAGNESIUM SERPL-MCNC: 2 MG/DL — SIGNIFICANT CHANGE UP (ref 1.6–2.6)
MCHC RBC-ENTMCNC: 30.2 PG — SIGNIFICANT CHANGE UP (ref 27–34)
MCHC RBC-ENTMCNC: 34.7 G/DL — SIGNIFICANT CHANGE UP (ref 32–36)
MCV RBC AUTO: 87.1 FL — SIGNIFICANT CHANGE UP (ref 80–100)
MONOCYTES # BLD AUTO: 0.71 K/UL — SIGNIFICANT CHANGE UP (ref 0–0.9)
MONOCYTES NFR BLD AUTO: 9.8 % — SIGNIFICANT CHANGE UP (ref 2–14)
NEUTROPHILS # BLD AUTO: 4.78 K/UL — SIGNIFICANT CHANGE UP (ref 1.8–7.4)
NEUTROPHILS NFR BLD AUTO: 65.7 % — SIGNIFICANT CHANGE UP (ref 43–77)
NRBC BLD AUTO-RTO: 0 /100 WBCS — SIGNIFICANT CHANGE UP (ref 0–0)
PHOSPHATE SERPL-MCNC: 4.3 MG/DL — SIGNIFICANT CHANGE UP (ref 2.5–4.5)
PLATELET # BLD AUTO: 219 K/UL — SIGNIFICANT CHANGE UP (ref 150–400)
POTASSIUM SERPL-MCNC: 3.9 MMOL/L — SIGNIFICANT CHANGE UP (ref 3.5–5.3)
POTASSIUM SERPL-SCNC: 3.9 MMOL/L — SIGNIFICANT CHANGE UP (ref 3.5–5.3)
PROTHROM AB SERPL-ACNC: 11.4 SEC — SIGNIFICANT CHANGE UP (ref 9.9–13.4)
RBC # BLD: 4.87 M/UL — SIGNIFICANT CHANGE UP (ref 4.2–5.8)
RBC # FLD: 11.9 % — SIGNIFICANT CHANGE UP (ref 10.3–14.5)
SODIUM SERPL-SCNC: 142 MMOL/L — SIGNIFICANT CHANGE UP (ref 135–145)
WBC # BLD: 7.27 K/UL — SIGNIFICANT CHANGE UP (ref 3.8–10.5)
WBC # FLD AUTO: 7.27 K/UL — SIGNIFICANT CHANGE UP (ref 3.8–10.5)

## 2025-03-01 PROCEDURE — 99232 SBSQ HOSP IP/OBS MODERATE 35: CPT

## 2025-03-01 PROCEDURE — 99233 SBSQ HOSP IP/OBS HIGH 50: CPT

## 2025-03-01 RX ORDER — DEXTROSE 50 % IN WATER 50 %
15 SYRINGE (ML) INTRAVENOUS ONCE
Refills: 0 | Status: DISCONTINUED | OUTPATIENT
Start: 2025-03-01 | End: 2025-03-03

## 2025-03-01 RX ORDER — GLUCAGON 3 MG/1
1 POWDER NASAL ONCE
Refills: 0 | Status: DISCONTINUED | OUTPATIENT
Start: 2025-03-01 | End: 2025-03-03

## 2025-03-01 RX ORDER — SODIUM CHLORIDE 9 G/1000ML
1000 INJECTION, SOLUTION INTRAVENOUS
Refills: 0 | Status: DISCONTINUED | OUTPATIENT
Start: 2025-03-01 | End: 2025-03-03

## 2025-03-01 RX ORDER — DEXTROSE 50 % IN WATER 50 %
25 SYRINGE (ML) INTRAVENOUS ONCE
Refills: 0 | Status: DISCONTINUED | OUTPATIENT
Start: 2025-03-01 | End: 2025-03-03

## 2025-03-01 RX ORDER — CLOPIDOGREL BISULFATE 75 MG/1
75 TABLET, FILM COATED ORAL DAILY
Refills: 0 | Status: DISCONTINUED | OUTPATIENT
Start: 2025-03-01 | End: 2025-03-03

## 2025-03-01 RX ORDER — ACETAMINOPHEN 500 MG/5ML
650 LIQUID (ML) ORAL EVERY 6 HOURS
Refills: 0 | Status: DISCONTINUED | OUTPATIENT
Start: 2025-02-28 | End: 2025-03-03

## 2025-03-01 RX ORDER — DEXTROSE 50 % IN WATER 50 %
12.5 SYRINGE (ML) INTRAVENOUS ONCE
Refills: 0 | Status: DISCONTINUED | OUTPATIENT
Start: 2025-03-01 | End: 2025-03-03

## 2025-03-01 RX ORDER — HEPARIN SODIUM 1000 [USP'U]/ML
5000 INJECTION INTRAVENOUS; SUBCUTANEOUS EVERY 12 HOURS
Refills: 0 | Status: DISCONTINUED | OUTPATIENT
Start: 2025-03-01 | End: 2025-03-03

## 2025-03-01 RX ORDER — INSULIN LISPRO 100 U/ML
INJECTION, SOLUTION INTRAVENOUS; SUBCUTANEOUS AT BEDTIME
Refills: 0 | Status: DISCONTINUED | OUTPATIENT
Start: 2025-03-01 | End: 2025-03-03

## 2025-03-01 RX ORDER — INSULIN LISPRO 100 U/ML
INJECTION, SOLUTION INTRAVENOUS; SUBCUTANEOUS
Refills: 0 | Status: DISCONTINUED | OUTPATIENT
Start: 2025-03-01 | End: 2025-03-03

## 2025-03-01 RX ADMIN — HEPARIN SODIUM 5000 UNIT(S): 1000 INJECTION INTRAVENOUS; SUBCUTANEOUS at 05:03

## 2025-03-01 RX ADMIN — Medication 81 MILLIGRAM(S): at 11:31

## 2025-03-01 RX ADMIN — HEPARIN SODIUM 5000 UNIT(S): 1000 INJECTION INTRAVENOUS; SUBCUTANEOUS at 17:53

## 2025-03-01 RX ADMIN — Medication 40 MILLIGRAM(S): at 05:03

## 2025-03-01 RX ADMIN — CLOPIDOGREL BISULFATE 75 MILLIGRAM(S): 75 TABLET, FILM COATED ORAL at 11:31

## 2025-03-01 RX ADMIN — METOPROLOL SUCCINATE 50 MILLIGRAM(S): 50 TABLET, EXTENDED RELEASE ORAL at 05:03

## 2025-03-01 RX ADMIN — ATORVASTATIN CALCIUM 40 MILLIGRAM(S): 80 TABLET, FILM COATED ORAL at 21:30

## 2025-03-02 LAB
GLUCOSE BLDC GLUCOMTR-MCNC: 116 MG/DL — HIGH (ref 70–99)
GLUCOSE BLDC GLUCOMTR-MCNC: 133 MG/DL — HIGH (ref 70–99)
GLUCOSE BLDC GLUCOMTR-MCNC: 134 MG/DL — HIGH (ref 70–99)
GLUCOSE BLDC GLUCOMTR-MCNC: 149 MG/DL — HIGH (ref 70–99)

## 2025-03-02 PROCEDURE — 93016 CV STRESS TEST SUPVJ ONLY: CPT

## 2025-03-02 PROCEDURE — 78452 HT MUSCLE IMAGE SPECT MULT: CPT | Mod: 26

## 2025-03-02 PROCEDURE — 93018 CV STRESS TEST I&R ONLY: CPT

## 2025-03-02 PROCEDURE — 99232 SBSQ HOSP IP/OBS MODERATE 35: CPT

## 2025-03-02 PROCEDURE — 99233 SBSQ HOSP IP/OBS HIGH 50: CPT

## 2025-03-02 RX ADMIN — Medication 40 MILLIGRAM(S): at 05:13

## 2025-03-02 RX ADMIN — HEPARIN SODIUM 5000 UNIT(S): 1000 INJECTION INTRAVENOUS; SUBCUTANEOUS at 17:30

## 2025-03-02 RX ADMIN — ATORVASTATIN CALCIUM 40 MILLIGRAM(S): 80 TABLET, FILM COATED ORAL at 21:43

## 2025-03-02 RX ADMIN — Medication 81 MILLIGRAM(S): at 12:26

## 2025-03-02 RX ADMIN — HEPARIN SODIUM 5000 UNIT(S): 1000 INJECTION INTRAVENOUS; SUBCUTANEOUS at 05:13

## 2025-03-02 RX ADMIN — METOPROLOL SUCCINATE 50 MILLIGRAM(S): 50 TABLET, EXTENDED RELEASE ORAL at 05:13

## 2025-03-02 RX ADMIN — CLOPIDOGREL BISULFATE 75 MILLIGRAM(S): 75 TABLET, FILM COATED ORAL at 12:27

## 2025-03-03 ENCOUNTER — TRANSCRIPTION ENCOUNTER (OUTPATIENT)
Age: 55
End: 2025-03-03

## 2025-03-03 ENCOUNTER — RESULT REVIEW (OUTPATIENT)
Age: 55
End: 2025-03-03

## 2025-03-03 VITALS
OXYGEN SATURATION: 99 % | RESPIRATION RATE: 18 BRPM | TEMPERATURE: 98 F | SYSTOLIC BLOOD PRESSURE: 116 MMHG | HEART RATE: 66 BPM | DIASTOLIC BLOOD PRESSURE: 72 MMHG

## 2025-03-03 DIAGNOSIS — R07.9 CHEST PAIN, UNSPECIFIED: ICD-10-CM

## 2025-03-03 DIAGNOSIS — Z00.00 ENCOUNTER FOR GENERAL ADULT MEDICAL EXAMINATION WITHOUT ABNORMAL FINDINGS: ICD-10-CM

## 2025-03-03 LAB
GLUCOSE BLDC GLUCOMTR-MCNC: 118 MG/DL — HIGH (ref 70–99)
GLUCOSE BLDC GLUCOMTR-MCNC: 136 MG/DL — HIGH (ref 70–99)
GLUCOSE BLDC GLUCOMTR-MCNC: 139 MG/DL — HIGH (ref 70–99)

## 2025-03-03 PROCEDURE — 84295 ASSAY OF SERUM SODIUM: CPT

## 2025-03-03 PROCEDURE — 99232 SBSQ HOSP IP/OBS MODERATE 35: CPT

## 2025-03-03 PROCEDURE — 78452 HT MUSCLE IMAGE SPECT MULT: CPT | Mod: MC

## 2025-03-03 PROCEDURE — 93306 TTE W/DOPPLER COMPLETE: CPT | Mod: 26

## 2025-03-03 PROCEDURE — 82803 BLOOD GASES ANY COMBINATION: CPT

## 2025-03-03 PROCEDURE — 99285 EMERGENCY DEPT VISIT HI MDM: CPT

## 2025-03-03 PROCEDURE — 93356 MYOCRD STRAIN IMG SPCKL TRCK: CPT

## 2025-03-03 PROCEDURE — 85730 THROMBOPLASTIN TIME PARTIAL: CPT

## 2025-03-03 PROCEDURE — 84484 ASSAY OF TROPONIN QUANT: CPT

## 2025-03-03 PROCEDURE — 84100 ASSAY OF PHOSPHORUS: CPT

## 2025-03-03 PROCEDURE — 80048 BASIC METABOLIC PNL TOTAL CA: CPT

## 2025-03-03 PROCEDURE — 82435 ASSAY OF BLOOD CHLORIDE: CPT

## 2025-03-03 PROCEDURE — 85018 HEMOGLOBIN: CPT

## 2025-03-03 PROCEDURE — 80053 COMPREHEN METABOLIC PANEL: CPT

## 2025-03-03 PROCEDURE — 93306 TTE W/DOPPLER COMPLETE: CPT

## 2025-03-03 PROCEDURE — 83605 ASSAY OF LACTIC ACID: CPT

## 2025-03-03 PROCEDURE — C1887: CPT

## 2025-03-03 PROCEDURE — 93458 L HRT ARTERY/VENTRICLE ANGIO: CPT

## 2025-03-03 PROCEDURE — 99152 MOD SED SAME PHYS/QHP 5/>YRS: CPT

## 2025-03-03 PROCEDURE — 82947 ASSAY GLUCOSE BLOOD QUANT: CPT

## 2025-03-03 PROCEDURE — 85610 PROTHROMBIN TIME: CPT

## 2025-03-03 PROCEDURE — 85379 FIBRIN DEGRADATION QUANT: CPT

## 2025-03-03 PROCEDURE — 93458 L HRT ARTERY/VENTRICLE ANGIO: CPT | Mod: 26

## 2025-03-03 PROCEDURE — 82330 ASSAY OF CALCIUM: CPT

## 2025-03-03 PROCEDURE — 83735 ASSAY OF MAGNESIUM: CPT

## 2025-03-03 PROCEDURE — 85025 COMPLETE CBC W/AUTO DIFF WBC: CPT

## 2025-03-03 PROCEDURE — C1894: CPT

## 2025-03-03 PROCEDURE — 99233 SBSQ HOSP IP/OBS HIGH 50: CPT

## 2025-03-03 PROCEDURE — 93017 CV STRESS TEST TRACING ONLY: CPT

## 2025-03-03 PROCEDURE — C1769: CPT

## 2025-03-03 PROCEDURE — 71046 X-RAY EXAM CHEST 2 VIEWS: CPT

## 2025-03-03 PROCEDURE — 83036 HEMOGLOBIN GLYCOSYLATED A1C: CPT

## 2025-03-03 PROCEDURE — A9500: CPT

## 2025-03-03 PROCEDURE — 85014 HEMATOCRIT: CPT

## 2025-03-03 PROCEDURE — 82962 GLUCOSE BLOOD TEST: CPT

## 2025-03-03 PROCEDURE — 84132 ASSAY OF SERUM POTASSIUM: CPT

## 2025-03-03 RX ADMIN — Medication 250 MILLILITER(S): at 14:45

## 2025-03-03 RX ADMIN — HEPARIN SODIUM 5000 UNIT(S): 1000 INJECTION INTRAVENOUS; SUBCUTANEOUS at 05:33

## 2025-03-03 RX ADMIN — Medication 40 MILLIGRAM(S): at 05:31

## 2025-03-03 RX ADMIN — Medication 81 MILLIGRAM(S): at 11:37

## 2025-03-03 RX ADMIN — METOPROLOL SUCCINATE 50 MILLIGRAM(S): 50 TABLET, EXTENDED RELEASE ORAL at 05:32

## 2025-03-03 RX ADMIN — Medication 75 MILLILITER(S): at 14:45

## 2025-03-03 RX ADMIN — CLOPIDOGREL BISULFATE 75 MILLIGRAM(S): 75 TABLET, FILM COATED ORAL at 11:38

## 2025-03-03 NOTE — DISCHARGE NOTE PROVIDER - NSDCFUADDAPPT_GEN_ALL_CORE_FT
APPTS ARE READY TO BE MADE: [x] YES    Best Family or Patient Contact (if needed):    Additional Information about above appointments (if needed):    1:   2:   3:     Other comments or requests:    APPTS ARE READY TO BE MADE: [x] YES    Best Family or Patient Contact (if needed):    Additional Information about above appointments (if needed):    1:   2:   3:     Other comments or requests:   Patient was outreached but did not answer. A voicemail was left for the patient to return our call. Left message for patient using [employer] .    Prior to outreaching the patient, it was visible that the patient has secured a follow up appointment which was not scheduled by our team. Pt had RPA with cardiologist Dr. Grant Francis 3/19/25 at 2:45pm at [address] [place] [address]

## 2025-03-03 NOTE — DISCHARGE NOTE PROVIDER - HOSPITAL COURSE
HPI:  55M with PMH of HTN, HLD, T2DM, hx of STEMI in 2022, s/p PCI to LAD, c/b cardiogenic shock and CCU stay. TTE w/ largely preserved EF and no significant valvular heart disease. He is here for intermittent cp x 1-2 weeks.     Hospital Course:  1. CAD, hx of STEMI in 2022 s/p PCI to LAD   -cont asa and plavix   -cont toprol XL 50 mg qd   -cont atorva 40 mg qd   -cont valsartan 40 mg qd     2. Intermittent cp, hx of CAD, consistent with stable angina   -trop negative   -no sig STT changes   -no tachycardiac or hypoxic, DDimer neg  -3/2 NST + reversible defect   -LHC via RRA access - LAD 40%    Important Medication Changes and Reason: none    Active or Pending Issues Requiring Follow-up: PCP, cardiology    Advanced Directives:   [x] Full code  [ ] DNR  [ ] Hospice    Discharge Diagnoses:  chest pain  CAD       HPI:  55M with PMH of HTN, HLD, T2DM, hx of STEMI in 2022, s/p PCI to LAD, c/b cardiogenic shock and CCU stay. TTE w/ largely preserved EF and no significant valvular heart disease. He is here for intermittent cp x 1-2 weeks.     Hospital Course:  1. CAD, hx of STEMI in 2022 s/p PCI to LAD   -cont asa and plavix   -cont toprol XL 50 mg qd   -cont atorva 40 mg qd   -cont valsartan 40 mg qd     2. Intermittent cp, hx of CAD, consistent with stable angina   -trop negative   -no sig STT changes   -no tachycardiac or hypoxic, DDimer neg  -3/2 NST + reversible defect   -LHC via RRA access - LAD 40%    Important Medication Changes and Reason: see med rec    Active or Pending Issues Requiring Follow-up: PCP, cardiology    Advanced Directives:   [x] Full code  [ ] DNR  [ ] Hospice    Discharge Diagnoses:  chest pain  CAD

## 2025-03-03 NOTE — DISCHARGE NOTE PROVIDER - CARE PROVIDER_API CALL
Eugenia Ramires  Interventional Cardiology  [address] [place] [address], [address] [place] [id number]  Phone: [phone]  Fax: [phone]  Follow Up Time: 1 week

## 2025-03-03 NOTE — PROGRESS NOTE ADULT - PROBLEM SELECTOR PLAN 5
DVT ppx: Hep  Diet: DASH/CC  Dispo: Pending clinical improvement

## 2025-03-03 NOTE — DISCHARGE NOTE NURSING/CASE MANAGEMENT/SOCIAL WORK - FINANCIAL ASSISTANCE
[facility] provides services at a reduced cost to those who are determined to be eligible through [facility]’s financial assistance program. Information regarding [facility]’s financial assistance program can be found by going to [email or web address] or by calling [phone].

## 2025-03-03 NOTE — PROGRESS NOTE ADULT - PROBLEM SELECTOR PROBLEM 1
Coronary artery disease with angina pectoris

## 2025-03-03 NOTE — PROGRESS NOTE ADULT - ASSESSMENT
55M with PMH of HTN, HLD, T2DM, hx of STEMI in 2022, s/p PCI to LAD, c/b cardiogenic shock and CCU stay. TTE w/ largely preserved EF and no significant valvular heart disease. He is here for intermittent cp x 1-2 weeks.     1. CAD, hx of STEMI in 2022 s/p PCI to LAD   -cont asa and plavix   -cont toprol XL 50 mg qd   -cont atorva 40 mg qd   -cont valsartan 40 mg qd     2. Intermittent cp, hx of CAD, consistent with stable angina   -trop negative   -no sig STT changes   -no tachycardiac or hypoxic, less likely to be PE. would check a DDimer level   -recommend inpt NST and TTE ECHO for CAD ady Bowden MD [facility]  Attending Interventional Cardiologist, [facility]-NS/CLAUDIA.   Avaliable on [employer] Team
55M with PMH of HTN, HLD, T2DM, hx of STEMI in 2022, s/p PCI to LAD, c/b cardiogenic shock and CCU stay. TTE w/ largely preserved EF and no significant valvular heart disease. He is here for intermittent cp x 1-2 weeks.     1. CAD, hx of STEMI in 2022 s/p PCI to LAD   -cont asa and plavix   -cont toprol XL 50 mg qd   -cont atorva 40 mg qd   -cont valsartan 40 mg qd     2. Intermittent cp, hx of CAD, consistent with stable angina   -trop negative   -no sig STT changes   -no tachycardiac or hypoxic, less likely to be PE. would check a DDimer level   -recommend inpt NST and TTE ECHO for CAD eval   -3/2 NST + reversible defect   -plan for [facility] 3/3     Danny Bowden MD [facility]  Attending Interventional Cardiologist, [facility]-NS/CLAUDIA.   Avaliable on [employer] Team
55M with PMH of HTN, HLD, T2DM, hx of STEMI in 2022, s/p PCI to LAD, c/b cardiogenic shock and CCU stay. TTE w/ largely preserved EF and no significant valvular heart disease. He is here for intermittent cp x 1-2 weeks.     1. CAD, hx of STEMI in 2022 s/p PCI to LAD   -cont asa and plavix   -cont toprol XL 50 mg qd   -cont atorva 40 mg qd   -cont valsartan 40 mg qd     2. Intermittent cp, hx of CAD, consistent with stable angina   -trop negative   -no sig STT changes   -no tachycardiac or hypoxic, less likely to be PE. would check a DDimer level   -recommend inpt NST and TTE ECHO for CAD eval   -3/2 NST + reversible defect   -plan for [facility] 3/3- nonobs   -home 3/3     Danny Bowden MD [facility]  Attending Interventional Cardiologist, [facility]-NS/CLUADIA.   Avaliable on [employer] Team
55M w/ PMH of HTN, HLD, DM2, CAD w/ hx of STEMI 2022 s/p PCI to LAD c/b cardiogenic shock pw CP

## 2025-03-03 NOTE — PROGRESS NOTE ADULT - PROBLEM SELECTOR PLAN 2
A1C 6.7  - Hold home oral hypoglycemics  - continue TERRELL  - Diabetic education  - CC diet
A1C 6.7  - Hold home oral hypoglycemics  - Start TERRELL  - Diabetic education  - CC diet
- f/u AM A1c  - Hold home oral hypoglycemics  - Start TERRELL  - Diabetic education  - CC diet

## 2025-03-03 NOTE — DISCHARGE NOTE PROVIDER - CARE PROVIDERS DIRECT ADDRESSES
That is fine. Have them fax the paperwork to [phone]. [email or web address]

## 2025-03-03 NOTE — DISCHARGE NOTE PROVIDER - NSDCCPCAREPLAN_GEN_ALL_CORE_FT
PRINCIPAL DISCHARGE DIAGNOSIS  Diagnosis: Chest pain  Assessment and Plan of Treatment:   Your cardiac assessment showed a negative troponin test and no significant ST-T wave changes, indicating no acute ischemic events. A nuclear stress test conducted on March 2nd revealed a reversible defect, suggesting a potential area of ischemia that warrants further attention. To explore this, a left heart catheterization (LHC) was performed via right radial artery (RRA) access, which identified a 40% stenosis in the left anterior descending (LAD) artery. This finding highlights a mild to moderate level of coronary artery disease that may require ongoing monitoring and possibly future intervention based on clinical progression and symptoms.  Please follow up with your cardiologist after discharge.      SECONDARY DISCHARGE DIAGNOSES  Diagnosis: CAD (coronary artery disease)  Assessment and Plan of Treatment: Coronary artery disease is a condition in which the arteries that supply the heart muscle become clogged with fatty deposits, increasing your risk of a heart attack. If you experience any new pain, pressure, or discomfort in the center of your chest, pain, tingling, or discomfort in your arms, back, neck, jaw, or stomach, shortness of breath, nausea, vomiting, burping, heartburn, sweating, cold and clammy skin, or a racing or abnormal heartbeat lasting more than 10 minutes, or if these symptoms keep coming and going, call your doctor immediately. In an emergency, call 911 and do not attempt to drive yourself to the hospital.  Ensure you take your cardiac medications as prescribed to lower cholesterol, reduce blood pressure, prevent blood clots with aspirin, and control diabetes.  Finally, make sure to keep all appointments with your doctor for cardiac follow-up care.

## 2025-03-03 NOTE — PROGRESS NOTE ADULT - PROBLEM SELECTOR PLAN 1
- ECG non-ischemic w/ trops negative  - Monitor on tele  - f/u TTE to r/o WMA   - NST to further assess  - c/w ASA 81mg qd and Plavix 75mg qd   - c/w Toprol 50mg qd  - c/w Atorvastatin 40mg qhs  - c/w Valsartan 40mg qd  - If CP ON, STAT ECG and cardiac enzymes   - Cards following, recs appreciated
- ECG non-ischemic w/ trops negative  - Monitor on tele  - f/u TTE to r/o WMA   - NST to further assess  - c/w ASA 81mg qd and Plavix 75mg qd   - c/w Toprol 50mg qd  - c/w Atorvastatin 40mg qhs  - c/w Valsartan 40mg qd  - If CP ON, STAT ECG and cardiac enzymes   - Cards following, recs appreciated
- ECG non-ischemic w/ trops negative  - Monitor on tele  - f/u TTE to r/o WMA   - NST with gasper-infarct ischemia, LH today  - c/w ASA 81mg qd and Plavix 75mg qd   - c/w Toprol 50mg qd  - c/w Atorvastatin 40mg qhs  - c/w Valsartan 40mg qd  - If CP ON, STAT ECG and cardiac enzymes   - Cards following, f/u recs

## 2025-03-03 NOTE — PROGRESS NOTE ADULT - TIME BILLING
reviewing tests and imaging, independently obtaining a history, performing a physical examination, discussing the plan with the patient, ordering medications/tests, documenting clinical information, and coordinating care.
Time-based billing (NON-critical care).     The necessity of the time spent during the encounter on this date of service was due to:     - Ordering, reviewing, and interpreting labs, testing, and imaging.  - Independently obtaining a review of systems and performing a physical exam  - Reviewing prior hospitalization and where necessary, outpatient records.  - Counselling and educating patient and family regarding interpretation of aforementioned items and plan of care.
reviewing tests and imaging, independently obtaining a history, performing a physical examination, discussing the plan with the patient, ordering medications/tests, documenting clinical information, and coordinating care.

## 2025-03-17 PROBLEM — I21.3 ST ELEVATION (STEMI) MYOCARDIAL INFARCTION OF UNSPECIFIED SITE: Chronic | Status: ACTIVE | Noted: 2025-03-01

## 2025-03-19 ENCOUNTER — OUTPATIENT (OUTPATIENT)
Dept: OUTPATIENT SERVICES | Facility: HOSPITAL | Age: 55
LOS: 1 days | End: 2025-03-19
Payer: SELF-PAY

## 2025-03-19 ENCOUNTER — APPOINTMENT (OUTPATIENT)
Dept: CARDIOLOGY | Facility: HOSPITAL | Age: 55
End: 2025-03-19

## 2025-03-19 VITALS
WEIGHT: 250 LBS | OXYGEN SATURATION: 98 % | HEART RATE: 70 BPM | BODY MASS INDEX: 35.87 KG/M2 | DIASTOLIC BLOOD PRESSURE: 69 MMHG | SYSTOLIC BLOOD PRESSURE: 119 MMHG

## 2025-03-19 DIAGNOSIS — I25.10 ATHEROSCLEROTIC HEART DISEASE OF NATIVE CORONARY ARTERY W/OUT ANGINA PECTORIS: ICD-10-CM

## 2025-03-19 DIAGNOSIS — I25.10 ATHEROSCLEROTIC HEART DISEASE OF NATIVE CORONARY ARTERY WITHOUT ANGINA PECTORIS: ICD-10-CM

## 2025-03-19 DIAGNOSIS — Z98.890 OTHER SPECIFIED POSTPROCEDURAL STATES: Chronic | ICD-10-CM

## 2025-03-19 DIAGNOSIS — E78.5 HYPERLIPIDEMIA, UNSPECIFIED: ICD-10-CM

## 2025-03-19 PROCEDURE — 93005 ELECTROCARDIOGRAM TRACING: CPT

## 2025-03-19 PROCEDURE — G0463: CPT

## 2025-03-20 DIAGNOSIS — E78.5 HYPERLIPIDEMIA, UNSPECIFIED: ICD-10-CM

## 2025-06-25 ENCOUNTER — OUTPATIENT (OUTPATIENT)
Dept: OUTPATIENT SERVICES | Facility: HOSPITAL | Age: 55
LOS: 1 days | End: 2025-06-25
Payer: SELF-PAY

## 2025-06-25 ENCOUNTER — APPOINTMENT (OUTPATIENT)
Dept: CARDIOLOGY | Facility: HOSPITAL | Age: 55
End: 2025-06-25

## 2025-06-25 VITALS
DIASTOLIC BLOOD PRESSURE: 71 MMHG | HEART RATE: 77 BPM | HEIGHT: 70 IN | OXYGEN SATURATION: 98 % | BODY MASS INDEX: 35.5 KG/M2 | WEIGHT: 248 LBS | SYSTOLIC BLOOD PRESSURE: 118 MMHG

## 2025-06-25 DIAGNOSIS — Z98.890 OTHER SPECIFIED POSTPROCEDURAL STATES: Chronic | ICD-10-CM

## 2025-06-25 DIAGNOSIS — I25.10 ATHEROSCLEROTIC HEART DISEASE OF NATIVE CORONARY ARTERY WITHOUT ANGINA PECTORIS: ICD-10-CM

## 2025-06-25 DIAGNOSIS — E78.5 HYPERLIPIDEMIA, UNSPECIFIED: ICD-10-CM

## 2025-06-25 PROCEDURE — G0463: CPT

## 2025-06-25 PROCEDURE — 93005 ELECTROCARDIOGRAM TRACING: CPT
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
## 2025-07-16 ENCOUNTER — APPOINTMENT (OUTPATIENT)
Dept: CARDIOLOGY | Facility: HOSPITAL | Age: 55
End: 2025-07-16